# Patient Record
Sex: FEMALE | Race: BLACK OR AFRICAN AMERICAN
[De-identification: names, ages, dates, MRNs, and addresses within clinical notes are randomized per-mention and may not be internally consistent; named-entity substitution may affect disease eponyms.]

---

## 2018-06-03 ENCOUNTER — HOSPITAL ENCOUNTER (INPATIENT)
Dept: HOSPITAL 92 - ERS | Age: 18
LOS: 2 days | Discharge: HOME | DRG: 872 | End: 2018-06-05
Attending: FAMILY MEDICINE | Admitting: FAMILY MEDICINE
Payer: SELF-PAY

## 2018-06-03 VITALS — BODY MASS INDEX: 22.7 KG/M2

## 2018-06-03 DIAGNOSIS — A41.9: Primary | ICD-10-CM

## 2018-06-03 DIAGNOSIS — N12: ICD-10-CM

## 2018-06-03 DIAGNOSIS — B96.89: ICD-10-CM

## 2018-06-03 DIAGNOSIS — N10: ICD-10-CM

## 2018-06-03 DIAGNOSIS — E87.2: ICD-10-CM

## 2018-06-03 LAB
ALBUMIN SERPL BCG-MCNC: 4.1 G/DL (ref 3.5–5)
ALP SERPL-CCNC: 63 U/L (ref 40–150)
ALT SERPL W P-5'-P-CCNC: (no result) U/L (ref 8–55)
ANION GAP SERPL CALC-SCNC: 15 MMOL/L (ref 10–20)
AST SERPL-CCNC: 18 U/L (ref 5–30)
BACTERIA UR QL AUTO: (no result) HPF
BASOPHILS # BLD AUTO: 0 THOU/UL (ref 0–0.2)
BASOPHILS NFR BLD AUTO: 0.5 % (ref 0–1)
BILIRUB SERPL-MCNC: 0.6 MG/DL (ref 0.2–1.2)
BUN SERPL-MCNC: 13 MG/DL (ref 8.4–21)
CALCIUM SERPL-MCNC: 9.3 MG/DL (ref 7.8–10.44)
CHLORIDE SERPL-SCNC: 108 MMOL/L (ref 98–107)
CO2 SERPL-SCNC: 18 MMOL/L (ref 22–29)
CREAT CL PREDICTED SERPL C-G-VRATE: 0 ML/MIN (ref 70–130)
CRYSTAL-AUWI FLAG: 0 (ref 0–15)
DRUG SCREEN CUTOFF: (no result)
EOSINOPHIL # BLD AUTO: 0.2 THOU/UL (ref 0–0.7)
EOSINOPHIL NFR BLD AUTO: 3.2 % (ref 0–10)
GLOBULIN SER CALC-MCNC: 3.2 G/DL (ref 2.4–3.5)
GLUCOSE SERPL-MCNC: 96 MG/DL (ref 70–105)
HEV IGM SER QL: 17.4 (ref 0–7.99)
HGB BLD-MCNC: 13.2 G/DL (ref 12–16)
HYALINE CASTS #/AREA URNS LPF: (no result) LPF
LIPASE SERPL-CCNC: 20 U/L (ref 8–78)
LYMPHOCYTES # BLD: 1.3 THOU/UL (ref 1.2–3.4)
LYMPHOCYTES NFR BLD AUTO: 16.6 % (ref 28–48)
MCH RBC QN AUTO: 29.3 PG (ref 25–35)
MCV RBC AUTO: 88.4 FL (ref 77–87)
MEDTOX CONTROL LINE VALID?: (no result)
MEDTOX READER #: (no result)
MONOCYTES # BLD AUTO: 0.1 THOU/UL (ref 0.11–0.59)
MONOCYTES NFR BLD AUTO: 1.2 % (ref 0–4)
NEUTROPHILS # BLD AUTO: 6.2 THOU/UL (ref 1.4–6.5)
NEUTROPHILS NFR BLD AUTO: 78.5 % (ref 31–61)
PATHC CAST-AUWI FLAG: 0.43 (ref 0–2.49)
PLATELET # BLD AUTO: 251 THOU/UL (ref 130–400)
POTASSIUM SERPL-SCNC: 3.6 MMOL/L (ref 3.5–5.1)
PREGS CONTROL BACKGROUND?: (no result)
PREGS CONTROL BAR APPEAR?: YES
RBC # BLD AUTO: 4.51 MILL/UL (ref 4–5.2)
RBC UR QL AUTO: (no result) HPF (ref 0–3)
SODIUM SERPL-SCNC: 137 MMOL/L (ref 136–145)
SP GR UR STRIP: 1.02 (ref 1–1.04)
SPERM-AUWI FLAG: 0 (ref 0–9.9)
WBC # BLD AUTO: 7.9 THOU/UL (ref 4.8–10.8)
YEAST-AUWI FLAG: 0 (ref 0–25)

## 2018-06-03 PROCEDURE — 36415 COLL VENOUS BLD VENIPUNCTURE: CPT

## 2018-06-03 PROCEDURE — 87086 URINE CULTURE/COLONY COUNT: CPT

## 2018-06-03 PROCEDURE — 83605 ASSAY OF LACTIC ACID: CPT

## 2018-06-03 PROCEDURE — 84703 CHORIONIC GONADOTROPIN ASSAY: CPT

## 2018-06-03 PROCEDURE — 81003 URINALYSIS AUTO W/O SCOPE: CPT

## 2018-06-03 PROCEDURE — 80048 BASIC METABOLIC PNL TOTAL CA: CPT

## 2018-06-03 PROCEDURE — 80306 DRUG TEST PRSMV INSTRMNT: CPT

## 2018-06-03 PROCEDURE — 96375 TX/PRO/DX INJ NEW DRUG ADDON: CPT

## 2018-06-03 PROCEDURE — 83690 ASSAY OF LIPASE: CPT

## 2018-06-03 PROCEDURE — 87149 DNA/RNA DIRECT PROBE: CPT

## 2018-06-03 PROCEDURE — 85025 COMPLETE CBC W/AUTO DIFF WBC: CPT

## 2018-06-03 PROCEDURE — 96361 HYDRATE IV INFUSION ADD-ON: CPT

## 2018-06-03 PROCEDURE — 96365 THER/PROPH/DIAG IV INF INIT: CPT

## 2018-06-03 PROCEDURE — 80053 COMPREHEN METABOLIC PANEL: CPT

## 2018-06-03 PROCEDURE — 87040 BLOOD CULTURE FOR BACTERIA: CPT

## 2018-06-03 PROCEDURE — 87077 CULTURE AEROBIC IDENTIFY: CPT

## 2018-06-03 PROCEDURE — 87186 SC STD MICRODIL/AGAR DIL: CPT

## 2018-06-03 PROCEDURE — 81015 MICROSCOPIC EXAM OF URINE: CPT

## 2018-06-03 RX ADMIN — CEFTRIAXONE SCH MLS: 2 INJECTION, POWDER, FOR SOLUTION INTRAMUSCULAR; INTRAVENOUS at 12:49

## 2018-06-03 NOTE — PDOC.FPRHP
- History of Present Illness


Chief Complaint: R Flank Pain


History of Present Illness: 


This is a 18 y/o F with no PMHx who presents to the ED due to R flank pain. She 

reports that she was going to lay down to go to sleep and then suddenly her 

back started to hurt. It was a dull pain that comes and goes. She reports 

subjective fevers, chills. She reports lower abdominal pain over the past 3-4 

days, but denies any dysuria, urinary frequency, or hematuria. She reports 

feeling her heart racing. 


Denies N/V, myalgias. 





She was brought in by EMS and had a temp of 102.1 and pulse of 120. EMS gave 

her 1g Tylenol. 


ED Course: 


The patient was evaluated in the ED by Dr. Palmer and was given 1g Cefazolin, 

2L NS boluses, and ativan 1mg. 





- Allergies/Adverse Reactions


 Allergies











Allergy/AdvReac Type Severity Reaction Status Date / Time


 


No Known Allergies Allergy   Verified 08/05/17 19:59














- Home Medications


 











 Medication  Instructions  Recorded  Confirmed  Type


 


No Known [No Known]  08/05/17 08/05/17 History














- History


PMHx: None


 


PSHx: None





FHx: Non-contributory


 


Social: Denies tobacco, EtOH, or drug use


 





- Review of Systems


General: reports: fever/chills.  denies: weight/appetite/sleep changes


Eyes: denies: eye pain, vision changes


ENT: reports: rhinorrhea.  denies: nasal congestion


Respiratory: denies: cough, shortness of breath


Cardiovascular: reports: palpitation.  denies: chest pain


Gastrointestinal: reports: abdominal pain (Suprapubic region).  denies: nausea, 

vomiting, diarrhea, constipation


Genitourinary: denies: dysuria, polyuria


Skin: denies: rashes, lesions


Musculoskeletal: denies: pain, tenderness


Neurological: denies: numbness, weakness





- Vital signs


BP: 116/68  HR: 96 RR: 16 Tmax: 99.5 Pox: 98% on RA  Wt: 59kg   








- Physical Exam


Constitutional: NAD, well developed, other (drowsy, easy to arouse, alert and 

oriented)


HEENT: normocephalic and atraumatic, PERRLA, EOMI, conjunctiva clear, grossly 

normal vision, grossly normal hearing, normal nasal mucosa, MMM, oropharynx 

clear


Neck: supple, FROM, no LAD


-Heart: 


tachycardic, regular rhythm, no murmurs, gallops, rubs, 2+ radial and pedal 

pulses


Lungs: CTAB, no respiratory distress, good air movement, no rales/rhonchi, no 

wheezing


-Abdomen: 


soft, mildly tender to palpation in suprapubic region, no rebound or guarding, 

normoactive bowel sounds, no CVA tenderness


Musculoskeletal: normal structure, normal tone


Skin: no rash/lesions, good turgor, capillary refill <2 seconds


Psychiatric: normal mood and affect, good judgment and insight, intact recent 

and remote memory





FMR H&P: Results





- Labs


Result Diagrams: 


 06/03/18 02:08





 06/03/18 01:56


Lab results: 


 











WBC  7.9 thou/uL (4.8-10.8)   06/03/18  02:08    


 


Hgb  13.2 g/dL (12.0-16.0)   06/03/18  02:08    


 


Hct  39.9 % (36.0-47.0)   06/03/18  02:08    


 


MCV  88.4 fl (77.0-87.0)  H  06/03/18  02:08    


 


Plt Count  251 thou/uL (130-400)   06/03/18  02:08    


 


Neutrophils %  78.5 % (31.0-61.0)  H  06/03/18  02:08    


 


Sodium  137 mmol/L (136-145)   06/03/18  01:56    


 


Potassium  3.6 mmol/L (3.5-5.1)   06/03/18  01:56    


 


Chloride  108 mmol/L ()  H  06/03/18  01:56    


 


Carbon Dioxide  18 mmol/L (22-29)  L  06/03/18  01:56    


 


BUN  13 mg/dL (8.4-21.0)   06/03/18  01:56    


 


Creatinine  0.82 mg/dL (0.6-1.1)   06/03/18  01:56    


 


Glucose  96 mg/dL ()   06/03/18  01:56    


 


Lactic Acid  3.7 mmol/L (0.5-2.2)  H  06/03/18  02:08    


 


Calcium  9.3 mg/dL (7.8-10.44)   06/03/18  01:56    


 


Total Bilirubin  0.6 mg/dL (0.2-1.2)   06/03/18  01:56    


 


AST  18 U/L (5-30)   06/03/18  01:56    


 


ALT  Less than  7 U/L (8-55)  L  06/03/18  01:56    


 


Alkaline Phosphatase  63 U/L ()   06/03/18  01:56    


 


Serum Total Protein  7.3 g/dL (6.0-8.3)   06/03/18  01:56    


 


Albumin  4.1 g/dL (3.5-5.0)   06/03/18  01:56    


 


Lipase  20 U/L (8-78)   06/03/18  01:56    


 


Urine Ketones  Negative mg/dL (Negative)   06/03/18  03:17    


 


Urine Blood  Negative  (Negative)   06/03/18  03:17    


 


Urine Nitrite  Positive  (Negative)  H  06/03/18  03:17    


 


Ur Leukocyte Esterase  Moderate  (Negative)  H  06/03/18  03:17    


 


Urine RBC  0-3 HPF (0-3)   06/03/18  03:17    


 


Urine WBC  11-20 HPF (0-3)  H  06/03/18  03:17    


 


Ur Squamous Epith Cells  4-6 HPF (0-3)  H  06/03/18  03:17    


 


Urine Bacteria  1+ HPF (None Seen)  H  06/03/18  03:17    














FMR H&P: A/P





- Problem List


(1) Sepsis


Current Visit: Yes   Status: Acute   Code(s): A41.9 - SEPSIS, UNSPECIFIED 

ORGANISM   


Qualifiers: 


   Sepsis type: sepsis due to unspecified organism   Qualified Code(s): A41.9 - 

Sepsis, unspecified organism   





(2) Pyelonephritis


Current Visit: Yes   Status: Acute   Code(s): N12 - TUBULO-INTERSTITIAL 

NEPHRITIS, NOT SPCF AS ACUTE OR CHRONIC   





(3) Lactic acidosis


Current Visit: No   Status: Acute   Code(s): E87.2 - ACIDOSIS   





- Plan


Sepsis 2/2 Pyelonephritis


Patient initially septic with fever, tachycardia, UTI as the source, and 

elevated lactic acid. Her urine had Nitrites, LE, WBC, and bacteria. She states 

that she did have CVA tenderness earlier, but did not have CVA tenderness 

during my examination in the ED. The patient is s/p 1g tylenol, 2L NS, 1g 

Cefazolin, and 1mg Ativan. She is still tachycardic, but her fever has 

improved. The patient does not have an elevated WBC count. 


-Rocephin


-Blood cultures


-Urine culture


-Acetaminophen prn fever


-LR @ 100


-Zofran prn nausea


-Trend lactic acid





Lactic Acidosis


Elevated lactic acid to 3.7 initially, likely 2/2 infection


-Trend lactic acid


-LR @ 100, s/p 2L NS boluses in the ED





VTE Ppx: SCD's


Code Status: full


Disposition/LOS: 


Admit to medical, length of stay likely 2 days





FMR H&P: Upper Level





- Pertinent history





PCP: CC





Patient is a 16yo F with no significant PMHx who presents with acute onset RT 

sided back pain. She states she was going to bed when all of a sudden she had 

dull like pain located at RT flank. Endorses few day history of bladder pain 

as well but denies any dysuria or increased urinary frequency. Endorses 

subjective fevers, chills and heart racing. Pt had temp of 102.1 in EMS. Denies 

any N/V, diarrhea, abd pain or myalgias. 





ED: Cefazolin 1g, 2L NS, Ativan 1mg, Tylenol 1g (EMS)





- Pertinent findings





T 99.1 /86   RR 30  O2 96% on RA  Wt. 59kg





General: NAD, resting in bed


Heart: S1 S2, tachycardic


Lungs: CTAB


Abd: soft, nt/nd/bs+, + suprapubic tenderness, no CVA tenderness





WBC 7.9, neutrophils 78.5%


Lactate 3.7


UA: leuks-moderate, nitrite (+), WBC 11-20, Bacteria 1+





- Plan


Date/Time: 06/03/18 7912





1. Sepsis 2/2 RT sided pyelonephritis: Patient with tachycardia to 119 and 

tachypnea of 30. Febrile to 102.1 in EMS but has since resolved with Tylenol. 

Patient likely has pyelonephritis though her exam was negative for CVA 

tenderness but states she did have it earlier. Given 2L bolus of NS in the ED. 

Cont IVF. Given Cefazolin in the ED. Cont Rocephin. Urine and blood cxs 

pending. 


2. RT sided pyelonephritis: See #1. 


3. Lactic acidosis: initial lactate of 3.7. Given 2L bolus of NS in the ED. 

Cont IVF. Repeat lactate q2H. 


4. Diet: regular


5. PPx: SCDs


6. Code Status: Full 





I, Shona Nolasco, have evaluated this patient and agree with findings/

plan as outlined by intern resident. Pertinent changes/additions are listed 

here.

## 2018-06-04 LAB
ANION GAP SERPL CALC-SCNC: 8 MMOL/L (ref 10–20)
BASOPHILS # BLD AUTO: 0 THOU/UL (ref 0–0.2)
BASOPHILS NFR BLD AUTO: 0.3 % (ref 0–1)
BUN SERPL-MCNC: 5 MG/DL (ref 8.4–21)
CALCIUM SERPL-MCNC: 8.8 MG/DL (ref 7.8–10.44)
CHLORIDE SERPL-SCNC: 111 MMOL/L (ref 98–107)
CO2 SERPL-SCNC: 22 MMOL/L (ref 22–29)
CREAT CL PREDICTED SERPL C-G-VRATE: 116 ML/MIN (ref 70–130)
EOSINOPHIL # BLD AUTO: 0.2 THOU/UL (ref 0–0.7)
EOSINOPHIL NFR BLD AUTO: 1.9 % (ref 0–10)
GLUCOSE SERPL-MCNC: 116 MG/DL (ref 70–105)
HGB BLD-MCNC: 12.1 G/DL (ref 12–16)
LYMPHOCYTES # BLD: 1.5 THOU/UL (ref 1.2–3.4)
LYMPHOCYTES NFR BLD AUTO: 15.1 % (ref 28–48)
MCH RBC QN AUTO: 29.1 PG (ref 25–35)
MCV RBC AUTO: 91.9 FL (ref 77–87)
MONOCYTES # BLD AUTO: 0.9 THOU/UL (ref 0.11–0.59)
MONOCYTES NFR BLD AUTO: 9.1 % (ref 0–4)
NEUTROPHILS # BLD AUTO: 7.2 THOU/UL (ref 1.4–6.5)
NEUTROPHILS NFR BLD AUTO: 73.5 % (ref 31–61)
PLATELET # BLD AUTO: 244 THOU/UL (ref 130–400)
POTASSIUM SERPL-SCNC: 3.7 MMOL/L (ref 3.5–5.1)
RBC # BLD AUTO: 4.15 MILL/UL (ref 4–5.2)
SODIUM SERPL-SCNC: 137 MMOL/L (ref 136–145)
WBC # BLD AUTO: 9.8 THOU/UL (ref 4.8–10.8)

## 2018-06-04 RX ADMIN — CEFTRIAXONE SCH MLS: 2 INJECTION, POWDER, FOR SOLUTION INTRAMUSCULAR; INTRAVENOUS at 12:00

## 2018-06-04 NOTE — PDOC.FM
- Subjective


Subjective: 





No acute events overnight. Denies dysuria or back pain today.





- Objective


MAR Reviewed: Yes


Vital Signs & Weight: 


 Vital Signs (12 hours)











  Temp Pulse Resp BP Pulse Ox


 


 06/04/18 04:00  98.5 F  80  18  101/64  97


 


 06/03/18 23:19  99.7 F H  78  20  100/61  99


 


 06/03/18 20:00  99.9 F H  100  18  112/63  99











I&O: 


 











 06/02/18 06/03/18 06/04/18





 06:59 06:59 06:59


 


Intake Total  300 


 


Output Total  0 


 


Balance  300 











Result Diagrams: 


 06/04/18 04:31





 06/04/18 04:31





<Tatyana Soliz - Last Filed: 06/04/18 09:14>





- Objective


Vital Signs & Weight: 


 Vital Signs (12 hours)











  Temp Pulse Resp BP Pulse Ox


 


 06/04/18 15:33  98.3 F  80  14  97/63  100


 


 06/04/18 11:44  98.0 F  60  15  97/63  99


 


 06/04/18 08:30  98.4 F  70  16   99


 


 06/04/18 08:25  98.4 F  70  16  94/56 L  99











I&O: 


 











 06/03/18 06/04/18 06/05/18





 06:59 06:59 06:59


 


Intake Total 300 2280 


 


Output Total 0  


 


Balance 300 2280 











Result Diagrams: 


 06/04/18 04:31





 06/04/18 04:31





<Fannie Betancourt - Last Filed: 06/04/18 19:22>





Phys Exam





- Physical Examination


Constitutional: NAD


HEENT: PERRLA, moist MMs


Neck: no JVD, supple


Respiratory: no wheezing, no rales, clear to auscultation bilateral


Cardiovascular: RRR, no significant murmur


Gastrointestinal: soft, non-tender, no distention


Musculoskeletal: no edema, pulses present


Neurological: non-focal, normal sensation


Psychiatric: normal affect, A&O x 3


Skin: no rash





<Tatyana Soliz - Last Filed: 06/04/18 09:14>





Dx/Plan


(1) Bacteremia


Code(s): R78.81 - BACTEREMIA   Status: Acute   





(2) Pyelonephritis


Code(s): N12 - TUBULO-INTERSTITIAL NEPHRITIS, NOT SPCF AS ACUTE OR CHRONIC   

Status: Acute   





(3) Sepsis


Code(s): A41.9 - SEPSIS, UNSPECIFIED ORGANISM   Status: Acute   


  QualifierTitle:    Sepsis type: sepsis due to unspecified organism   

Qualified Code(s): A41.9 - Sepsis, unspecified organism   





(4) Lactic acidosis


Code(s): E87.2 - ACIDOSIS   Status: Acute   





- Plan


Plan: 





Sepsis 2/2 Pyelonephritis


-Patient initially septic with fever, tachycardia, UTI as the source, and 

elevated lactic acid. 


-Urine had Nitrites, LE, WBC, and bacteria. 


-Right sided back pain, no cva tenderness on exam


-The patient is s/p 1g tylenol, 2L NS, 1g Cefazolin, and 1mg Ativan. 


-urine cx  is growing E. coli and patient's blood cx is + for gram negative rods

, likely E. coli from her urine.


-Continue IV rocephin until 48 hours afebrile, then we will transistion her to 

PO abx and consider discharge with abx for a total of 10 days.


-Acetaminophen prn fever


-LR @ 100


-Zofran prn nausea





Bacteremia 2/2 Pyelonephritis


-Consult ID for duration of treatment


-Continue IV rocephin until 48 hours afebrile, then we will transistion her to 

PO abx and consider discharge with abx for a total of 10 days.


-Acetaminophen prn fever


-LR @ 100


-Zofran prn nausea





Lactic Acidosis, resoled


-Elevated lactic acid to 3.7 initially, likely 2/2 infection


-lactic acid resolved


-LR @ 100, s/p 2L NS boluses in the ED





VTE Ppx: SCD's


Code Status: full





Disposition/LOS: 


Admit to medical, length of stay likely 2 days





<Tatyana Soliz - Last Filed: 06/04/18 09:14>





Attending Addendum





- Attending Addendum


Date/Time: 06/04/18 1922





I personally evaluated the patient and discussed the management with Dr. Soliz.


I agree with the History, Examination, Assessment and Plan documented above 

with any addition or exceptions noted below.


Pt will remain on IV antibiotics for bacteremia 2/2 to UTI until we get final 

sensitivities.





<Fannie Betancourt - Last Filed: 06/04/18 19:22>

## 2018-06-05 VITALS — TEMPERATURE: 98.2 F | DIASTOLIC BLOOD PRESSURE: 77 MMHG | SYSTOLIC BLOOD PRESSURE: 118 MMHG

## 2018-06-05 LAB
ANION GAP SERPL CALC-SCNC: 10 MMOL/L (ref 10–20)
BASOPHILS # BLD AUTO: 0.1 THOU/UL (ref 0–0.2)
BASOPHILS NFR BLD AUTO: 1.1 % (ref 0–1)
BUN SERPL-MCNC: 6 MG/DL (ref 8.4–21)
CALCIUM SERPL-MCNC: 9.2 MG/DL (ref 7.8–10.44)
CHLORIDE SERPL-SCNC: 112 MMOL/L (ref 98–107)
CO2 SERPL-SCNC: 20 MMOL/L (ref 22–29)
CREAT CL PREDICTED SERPL C-G-VRATE: 130 ML/MIN (ref 70–130)
EOSINOPHIL # BLD AUTO: 0.5 THOU/UL (ref 0–0.7)
EOSINOPHIL NFR BLD AUTO: 7.8 % (ref 0–10)
GLUCOSE SERPL-MCNC: 106 MG/DL (ref 70–105)
HGB BLD-MCNC: 12.6 G/DL (ref 12–16)
LYMPHOCYTES # BLD: 2 THOU/UL (ref 1.2–3.4)
LYMPHOCYTES NFR BLD AUTO: 31.2 % (ref 28–48)
MCH RBC QN AUTO: 29 PG (ref 25–35)
MCV RBC AUTO: 91.5 FL (ref 77–87)
MONOCYTES # BLD AUTO: 0.9 THOU/UL (ref 0.11–0.59)
MONOCYTES NFR BLD AUTO: 14.8 % (ref 0–4)
NEUTROPHILS # BLD AUTO: 2.9 THOU/UL (ref 1.4–6.5)
NEUTROPHILS NFR BLD AUTO: 45.1 % (ref 31–61)
PLATELET # BLD AUTO: 222 THOU/UL (ref 130–400)
POTASSIUM SERPL-SCNC: 4 MMOL/L (ref 3.5–5.1)
RBC # BLD AUTO: 4.32 MILL/UL (ref 4–5.2)
SODIUM SERPL-SCNC: 138 MMOL/L (ref 136–145)
WBC # BLD AUTO: 6.3 THOU/UL (ref 4.8–10.8)

## 2018-06-05 RX ADMIN — CEFTRIAXONE SCH MLS: 2 INJECTION, POWDER, FOR SOLUTION INTRAMUSCULAR; INTRAVENOUS at 11:46

## 2018-06-05 NOTE — PDOC.FM
- Subjective


Subjective: 





No acute events overnight. Afebrile for >48 hours. Denies dysuria or back pain.





- Objective


MAR Reviewed: Yes


Vital Signs & Weight: 


 Vital Signs (12 hours)











  Temp Pulse Resp BP Pulse Ox


 


 06/05/18 04:00  97.8 F  77  18  96/62  100


 


 06/05/18 00:56  98.0 F  68  14  94/59 L  100


 


 06/04/18 20:00  98.0 F  68  18  95/61  100











I&O: 


 











 06/04/18 06/05/18 06/06/18





 06:59 06:59 06:59


 


Intake Total 2280 2280 


 


Balance 2280 2280 











Result Diagrams: 


 06/05/18 03:23





 06/05/18 03:23





<Tatyana Soliz - Last Filed: 06/05/18 08:59>





- Objective


Vital Signs & Weight: 


 Vital Signs (12 hours)











  Temp Pulse Resp BP BP Pulse Ox


 


 06/05/18 12:28  98.2 F  59 L  14  118/77   100


 


 06/05/18 08:00  97.9 F  59 L  14   91/56 L  100


 


 06/05/18 04:00  97.8 F  77  18  96/62   100











I&O: 


 











 06/04/18 06/05/18 06/06/18





 06:59 06:59 06:59


 


Intake Total 2280 2280 


 


Balance 2280 2280 











Result Diagrams: 


 06/05/18 03:23





 06/05/18 03:23





<Fannie Betancourt - Last Filed: 06/05/18 14:28>





Phys Exam





- Physical Examination


Constitutional: NAD


HEENT: PERRLA, moist MMs


Respiratory: no wheezing, no rales, no rhonchi, clear to auscultation bilateral


Cardiovascular: RRR, no significant murmur


Gastrointestinal: soft, non-tender, no distention


Musculoskeletal: no edema, pulses present


Neurological: non-focal, normal sensation


Psychiatric: normal affect, A&O x 3


Skin: no rash, normal turgor





<Tatyana Soliz - Last Filed: 06/05/18 08:59>





Dx/Plan


(1) Bacteremia


Code(s): R78.81 - BACTEREMIA   Status: Acute   





(2) Pyelonephritis


Code(s): N12 - TUBULO-INTERSTITIAL NEPHRITIS, NOT SPCF AS ACUTE OR CHRONIC   

Status: Acute   





(3) Sepsis


Code(s): A41.9 - SEPSIS, UNSPECIFIED ORGANISM   Status: Acute   


  QualifierTitle:    Sepsis type: sepsis due to unspecified organism   

Qualified Code(s): A41.9 - Sepsis, unspecified organism   





(4) Lactic acidosis


Code(s): E87.2 - ACIDOSIS   Status: Acute   





- Plan


Plan: 





Sepsis 2/2 Pyelonephritis


-Patient initially septic with fever, tachycardia, UTI as the source, and 

elevated lactic acid. 


-Afebrile >48 hours


-urine and blood cx + for e. coli


-transition her to omnicef today for a total of 10 days.


-Acetaminophen prn fever


-LR @ 100


-Zofran prn nausea





Bacteremia 2/2 Pyelonephritis


-Consult ID for duration of treatment


-Continue IV rocephin until 48 hours afebrile, then we will transistion her to 

PO abx and consider discharge with abx for a total of 10 days.


-Acetaminophen prn fever


-LR @ 100


-Zofran prn nausea





Lactic Acidosis, resoled


-lactic acid resolved


-LR @ 100, s/p 2L NS boluses in the ED





VTE Ppx: SCD's


Code Status: full





Disposition/LOS: Likely DC today





<Tatyana Soliz - Last Filed: 06/05/18 08:59>





Attending Addendum





- Attending Addendum


Date/Time: 06/05/18 9400





I personally evaluated the patient and discussed the management with Dr. Soliz.


I agree with the History, Examination, Assessment and Plan documented above 

with any addition or exceptions noted below.


The patient is doing well.  will transition to omnicef and d/c with 14 day 

course of antibiotics.





<Fannie Betancourt - Last Filed: 06/05/18 14:28>

## 2019-06-18 ENCOUNTER — HOSPITAL ENCOUNTER (EMERGENCY)
Dept: HOSPITAL 92 - ERS | Age: 19
Discharge: HOME | End: 2019-06-18
Payer: SELF-PAY

## 2019-06-18 DIAGNOSIS — R10.9: ICD-10-CM

## 2019-06-18 DIAGNOSIS — Z3A.01: ICD-10-CM

## 2019-06-18 DIAGNOSIS — O23.41: Primary | ICD-10-CM

## 2019-06-18 DIAGNOSIS — O99.89: ICD-10-CM

## 2019-06-18 LAB
ALBUMIN SERPL BCG-MCNC: 4.6 G/DL (ref 3.5–5)
ALP SERPL-CCNC: 52 U/L (ref 40–150)
ALT SERPL W P-5'-P-CCNC: 7 U/L (ref 8–55)
ANION GAP SERPL CALC-SCNC: 12 MMOL/L (ref 10–20)
AST SERPL-CCNC: 15 U/L (ref 5–34)
BACTERIA UR QL AUTO: (no result) HPF
BASOPHILS # BLD AUTO: 0.1 THOU/UL (ref 0–0.2)
BASOPHILS NFR BLD AUTO: 1.1 % (ref 0–1)
BILIRUB SERPL-MCNC: 0.8 MG/DL (ref 0.2–1.2)
BUN SERPL-MCNC: 5 MG/DL (ref 7–18.7)
CALCIUM SERPL-MCNC: 9.7 MG/DL (ref 7.8–10.44)
CHLORIDE SERPL-SCNC: 105 MMOL/L (ref 98–107)
CO2 SERPL-SCNC: 21 MMOL/L (ref 22–29)
CREAT CL PREDICTED SERPL C-G-VRATE: 0 ML/MIN (ref 70–130)
CRYSTAL-AUWI FLAG: 0 (ref 0–15)
EOSINOPHIL # BLD AUTO: 0.2 THOU/UL (ref 0–0.7)
EOSINOPHIL NFR BLD AUTO: 3.5 % (ref 0–10)
GLOBULIN SER CALC-MCNC: 3.3 G/DL (ref 2.4–3.5)
GLUCOSE SERPL-MCNC: 88 MG/DL (ref 70–105)
HCG SERPL QL: POSITIVE
HEV IGM SER QL: 0.4 (ref 0–7.99)
HGB BLD-MCNC: 14.1 G/DL (ref 12–16)
HYALINE CASTS #/AREA URNS LPF: (no result) LPF
LIPASE SERPL-CCNC: 11 U/L (ref 8–78)
LYMPHOCYTES # BLD: 1.5 THOU/UL (ref 1.2–3.4)
LYMPHOCYTES NFR BLD AUTO: 26 % (ref 28–48)
MCH RBC QN AUTO: 28.7 PG (ref 25–35)
MCV RBC AUTO: 89.3 FL (ref 78–98)
MONOCYTES # BLD AUTO: 0.3 THOU/UL (ref 0.11–0.59)
MONOCYTES NFR BLD AUTO: 5.4 % (ref 0–4)
NEUTROPHILS # BLD AUTO: 3.6 THOU/UL (ref 1.4–6.5)
NEUTROPHILS NFR BLD AUTO: 64 % (ref 31–61)
PATHC CAST-AUWI FLAG: 1.63 (ref 0–2.49)
PLATELET # BLD AUTO: 266 THOU/UL (ref 130–400)
POTASSIUM SERPL-SCNC: 3.7 MMOL/L (ref 3.5–5.1)
PREGS CONTROL BACKGROUND?: (no result)
PREGS CONTROL BAR APPEAR?: YES
RBC # BLD AUTO: 4.9 MILL/UL (ref 4–5.2)
RBC UR QL AUTO: (no result) HPF (ref 0–3)
SODIUM SERPL-SCNC: 134 MMOL/L (ref 136–145)
SP GR UR STRIP: 1.02 (ref 1–1.04)
SPERM-AUWI FLAG: 0 (ref 0–9.9)
WBC # BLD AUTO: 5.7 THOU/UL (ref 4.8–10.8)
WBC UR QL AUTO: (no result) HPF (ref 0–3)
YEAST-AUWI FLAG: 0 (ref 0–25)

## 2019-06-18 PROCEDURE — 36415 COLL VENOUS BLD VENIPUNCTURE: CPT

## 2019-06-18 PROCEDURE — 80053 COMPREHEN METABOLIC PANEL: CPT

## 2019-06-18 PROCEDURE — 96361 HYDRATE IV INFUSION ADD-ON: CPT

## 2019-06-18 PROCEDURE — 93976 VASCULAR STUDY: CPT

## 2019-06-18 PROCEDURE — 85025 COMPLETE CBC W/AUTO DIFF WBC: CPT

## 2019-06-18 PROCEDURE — 84702 CHORIONIC GONADOTROPIN TEST: CPT

## 2019-06-18 PROCEDURE — 86900 BLOOD TYPING SEROLOGIC ABO: CPT

## 2019-06-18 PROCEDURE — 76856 US EXAM PELVIC COMPLETE: CPT

## 2019-06-18 PROCEDURE — 83690 ASSAY OF LIPASE: CPT

## 2019-06-18 PROCEDURE — 96365 THER/PROPH/DIAG IV INF INIT: CPT

## 2019-06-18 PROCEDURE — 86901 BLOOD TYPING SEROLOGIC RH(D): CPT

## 2019-06-18 PROCEDURE — 84703 CHORIONIC GONADOTROPIN ASSAY: CPT

## 2019-06-18 PROCEDURE — 81003 URINALYSIS AUTO W/O SCOPE: CPT

## 2019-06-18 PROCEDURE — 96375 TX/PRO/DX INJ NEW DRUG ADDON: CPT

## 2019-06-18 PROCEDURE — 96372 THER/PROPH/DIAG INJ SC/IM: CPT

## 2019-06-18 PROCEDURE — 81015 MICROSCOPIC EXAM OF URINE: CPT

## 2019-06-18 NOTE — ULT
US Pelvic W Doppler



HISTORY: Abdomen and pelvic pain no vaginal bleeding



COMPARISON: None.



FINDINGS: Real-time imaging of the pelvis was obtained transabdominally. This shows a single viable i
ntrauterine pregnancy. Gestational sac measurements are 1.7 cm corresponding to 6 weeks 4 days.

Crown to rump length measurements are 4.8 mm corresponding to 6 weeks 1 day. The fetal heart rate is 
117 bpm. No subchorionic bleed.



Neither the right or left ovaries are visualized. No free fluid.



IMPRESSION: Single viable anterior pregnancy measurements corresponding to 6 weeks 3 days with an est
imated date of delivery of 2/8/2020



Reported By: Zach Fairbanks 

Electronically Signed:  6/18/2019 9:34 PM

## 2019-06-20 ENCOUNTER — HOSPITAL ENCOUNTER (EMERGENCY)
Dept: HOSPITAL 92 - ERS | Age: 19
Discharge: HOME | End: 2019-06-20
Payer: MEDICAID

## 2019-06-20 DIAGNOSIS — Z3A.01: ICD-10-CM

## 2019-06-20 DIAGNOSIS — O21.0: Primary | ICD-10-CM

## 2019-06-20 LAB
ALBUMIN SERPL BCG-MCNC: 4.4 G/DL (ref 3.5–5)
ALP SERPL-CCNC: 51 U/L (ref 40–150)
ALT SERPL W P-5'-P-CCNC: 7 U/L (ref 8–55)
ANION GAP SERPL CALC-SCNC: 13 MMOL/L (ref 10–20)
AST SERPL-CCNC: 18 U/L (ref 5–34)
BASOPHILS # BLD AUTO: 0 THOU/UL (ref 0–0.2)
BASOPHILS NFR BLD AUTO: 0.6 % (ref 0–1)
BILIRUB SERPL-MCNC: 0.9 MG/DL (ref 0.2–1.2)
BUN SERPL-MCNC: 5 MG/DL (ref 7–18.7)
CALCIUM SERPL-MCNC: 9.8 MG/DL (ref 7.8–10.44)
CHLORIDE SERPL-SCNC: 106 MMOL/L (ref 98–107)
CO2 SERPL-SCNC: 21 MMOL/L (ref 22–29)
CREAT CL PREDICTED SERPL C-G-VRATE: 0 ML/MIN (ref 70–130)
EOSINOPHIL # BLD AUTO: 0.1 THOU/UL (ref 0–0.7)
EOSINOPHIL NFR BLD AUTO: 2.8 % (ref 0–10)
GLOBULIN SER CALC-MCNC: 3.3 G/DL (ref 2.4–3.5)
GLUCOSE SERPL-MCNC: 78 MG/DL (ref 70–105)
HGB BLD-MCNC: 13.7 G/DL (ref 12–16)
LYMPHOCYTES # BLD: 1.6 THOU/UL (ref 1.2–3.4)
LYMPHOCYTES NFR BLD AUTO: 30.3 % (ref 28–48)
MCH RBC QN AUTO: 28.9 PG (ref 25–35)
MCV RBC AUTO: 88.6 FL (ref 78–98)
MONOCYTES # BLD AUTO: 0.3 THOU/UL (ref 0.11–0.59)
MONOCYTES NFR BLD AUTO: 5.3 % (ref 0–4)
NEUTROPHILS # BLD AUTO: 3.2 THOU/UL (ref 1.4–6.5)
NEUTROPHILS NFR BLD AUTO: 60.9 % (ref 31–61)
PLATELET # BLD AUTO: 257 THOU/UL (ref 130–400)
POTASSIUM SERPL-SCNC: 3.7 MMOL/L (ref 3.5–5.1)
RBC # BLD AUTO: 4.75 MILL/UL (ref 4–5.2)
SODIUM SERPL-SCNC: 136 MMOL/L (ref 136–145)
WBC # BLD AUTO: 5.3 THOU/UL (ref 4.8–10.8)

## 2019-06-20 PROCEDURE — 80053 COMPREHEN METABOLIC PANEL: CPT

## 2019-06-20 PROCEDURE — 96375 TX/PRO/DX INJ NEW DRUG ADDON: CPT

## 2019-06-20 PROCEDURE — 85025 COMPLETE CBC W/AUTO DIFF WBC: CPT

## 2019-06-20 PROCEDURE — 96365 THER/PROPH/DIAG IV INF INIT: CPT

## 2019-06-20 PROCEDURE — 96361 HYDRATE IV INFUSION ADD-ON: CPT

## 2019-09-20 ENCOUNTER — HOSPITAL ENCOUNTER (OUTPATIENT)
Dept: HOSPITAL 92 - BICULT | Age: 19
Discharge: HOME | End: 2019-09-20
Payer: COMMERCIAL

## 2019-09-20 DIAGNOSIS — Z3A.20: ICD-10-CM

## 2019-09-20 DIAGNOSIS — Z34.82: Primary | ICD-10-CM

## 2019-09-20 PROCEDURE — 76805 OB US >/= 14 WKS SNGL FETUS: CPT

## 2019-09-20 NOTE — ULT
OB ULTRASOUND:

 

HISTORY: 

Fetal anatomy.

 

FINDINGS: 

A single live intrauterine gestation is seen with measurements corresponding to an estimated gestatio
nal age of 20 weeks 3 days and BRUNO at 2/4/2020.

 

The estimated fetal weight measures 347 gm or 12 ounces (73% by Hadlock criteria).

 

Fetal measurements are as follows:

BPD       4.66 cm, 20 weeks 1 day

HC       17.73 cm, 20 weeks 2 days

AC       14.84 cm, 20 weeks 1 day

FL        3.39 cm, 20 weeks 5 days

 

Fetal heart rate measures 150 b.p.m.  Placenta is posteriorly located without evidence of placenta pr
evia.  JUN measures 10.6 cm.  The cervix measures 3.2 cm in length.

 

A 3-vessel cord, cord insertion, fetal kidneys, bladder, stomach, 4-chamber heart, lateral ventricles
, cerebellum, spine, lips/nose, upper and lower extremities are visualized.  No definite fetal anomal
ies are seen.

 

IMPRESSION: 

Single live intrauterine pregnancy of 20 weeks 3 days estimated gestational age and estimated date of
 delivery at 2/4/2020.

 

POS: [facility]

## 2019-11-28 ENCOUNTER — HOSPITAL ENCOUNTER (OUTPATIENT)
Dept: HOSPITAL 92 - L&D/OP | Age: 19
Discharge: HOME | End: 2019-11-28
Attending: OBSTETRICS & GYNECOLOGY
Payer: COMMERCIAL

## 2019-11-28 VITALS — SYSTOLIC BLOOD PRESSURE: 121 MMHG | DIASTOLIC BLOOD PRESSURE: 69 MMHG | TEMPERATURE: 98.9 F

## 2019-11-28 VITALS — BODY MASS INDEX: 24.9 KG/M2

## 2019-11-28 DIAGNOSIS — Z3A.38: ICD-10-CM

## 2019-11-28 DIAGNOSIS — O47.1: Primary | ICD-10-CM

## 2019-11-28 PROCEDURE — 99282 EMERGENCY DEPT VISIT SF MDM: CPT

## 2019-11-28 PROCEDURE — 76815 OB US LIMITED FETUS(S): CPT

## 2019-11-28 NOTE — HP
This is a patient Dr. Dooley.



TIME:  0155.



LOCATION:  Labor and Delivery triage.



CHIEF COMPLAINT:  Irregular contractions at 30 weeks.



HISTORY OF PRESENT ILLNESS:  This is a 19-year-old, G2, P1, history of vaginal

delivery in the past, who is at 38 weeks and 2 days, who states she is having

irregular contractions since midnight.  She has no vaginal bleeding and no leakage

of fluid and she has good fetal movement.  She denies recent trauma or recent

intercourse. 



REVIEW OF SYSTEMS:  Complete review of systems was checked and is otherwise negative

unless specified in the HPI. 



PAST MEDICAL HISTORY:  Negative.



ALLERGIES:  REPORTED ARE NONE.



PAST SURGICAL HISTORY:  Noncontributory .



OB HISTORY:  She is a G2, P1.



PHYSICAL EXAMINATION:

VITAL SIGNS:  Blood pressure is 121/70, temperature is 98.9, respirations are 17 to

18, and they are unlabored.  Clinically, she is in no acute distress. 

ABDOMEN:  Soft and nontender. 

CERVICAL EXAM:  Deferred pending a cervical length ultrasound. 



Fetal monitor shows fetal heart tones in the 130s to 140s and they are reactive.

There is irregular uterine contractions on the tocodynamometer that looks more like

irritability. 



ASSESSMENT:  This is a G2, P1, at 38 weeks and 2 days, here with possible

contractions.  She denies recent intercourse. 



PLAN:  

1. Cervical length ordered.

2. If her cervical length is greater than 2.5, this is reassuring.  If it is less

than 2.5, we will order an FFN. 

3. Reactive nonstress test.

4. P.o. hydrate.

5. Final disposition will depend on the cervical length.







Job ID:  [id number]

## 2019-11-28 NOTE — ULT
PRELIMINARY REPORT/[facility]/EMERGENCY AFTER

HOURS PROCEDURE: 

 

PROCEDURE INFORMATION:

Exam: US Pregnancy, Limited

Exam date and time: 11/28/2019 2:10 AM

Age: 19 years old

 

Clinical history: Pregnancy complicated by abdominal or pelvic pain; Lower; Third trimester; 

Gestational age or lmp: 31w0d; Pregnant; Patient HX: Premature contractions, eval. Cervical length

 

TECHNIQUE:

Imaging protocol: Real-time ultrasound of the pregnant maternal uterus with i

mage documentation. Exam focused on the clinical indication.

COMPARISON:

 

No relevant prior studies available.

FINDINGS:

Single living intrauterine gestation in vertex presentation. Fetal heart rate: 145 bpm.

AUA: 31w0d. BRUNO(AUA): 1/30/2020. BRUNO(LMP): 2/4/2020. EFW: 1673g-61percentile.

Placenta is posterior grade 1. No abruption. JUN: 11.4cm. Cervix is closed measuring 3.1-3.4cm in demetrius
gth.

 

IMPRESSION:

Single viable intrauterine pregnancy. No acute findings.

 

Thank you for allowing us to participate in the care of your patient.

Dictated and Authenticated by: Hernan Arellano MD

11/28/2019 3:42 AM Central Time ([place] & [place])

 

 

 

 

FINAL REPORT 

 

EMERGENCY AFTER HOURS OB ULTRASOUND:

I agree with the preliminary report provided by Ashley. 

 

 

POS: [facility]

## 2019-12-06 ENCOUNTER — HOSPITAL ENCOUNTER (OUTPATIENT)
Dept: HOSPITAL 92 - BICULT | Age: 19
Discharge: HOME | End: 2019-12-06
Attending: OBSTETRICS & GYNECOLOGY
Payer: COMMERCIAL

## 2019-12-06 DIAGNOSIS — Z34.83: Primary | ICD-10-CM

## 2019-12-06 DIAGNOSIS — Z3A.31: ICD-10-CM

## 2019-12-06 PROCEDURE — 76815 OB US LIMITED FETUS(S): CPT

## 2019-12-06 NOTE — ULT
Limited Obstetrical Ultrasound



INDICATION: Evaluate fetal growth



TECHNIQUE: Grayscale, M-mode Doppler, color Doppler and spectral Doppler images were obtained. Go cobos is focused on the clinical indication.



COMPARISON: Prior Limited obstetrical ultrasound dated November 20, 2019



FINDINGS:

GESTATION:

Number of gestations: Single.

Presentation: Cephalic.

Fetal heart rate: 147 bpm.

Placental location: Posterior

Previa:  No evidence for previa.

Cervical length: Not measured

JUN: 9.06 cm.





LIMITED FETAL SURVEY:

No abnormality as visualized.



FETAL BIOMETRY:



Biparietal diameter: 7.80cm, 31 weeks and 3 days, Not calculated..

Head circumference: 28.51 cm, 31 weeks and 3 days, Not calculated.

Abdominal circumference: 27.27 cm, 31 weeks and 3 days, Not calculated.

Femoral length: 6.04cm, 31 weeks and 3 days, Not calculated.

Estimated fetal weight: 1751 g g +/- 256g 3 lbs. 14 oz., 36th percentile



The average gestational age by ultrasound is 31 weeks and 3 dayswith estimated due date of February 4
, 2020.



The estimated dates by clinical data is 31 weeks and 3 dayswith estimated due date of February 4, 202
0.





IMPRESSION:

1. Single live intrauterine gestation with size and dates as above.



Reported By: Jeovanny Salmon 

Electronically Signed:  12/6/2019 3:54 PM

## 2019-12-24 ENCOUNTER — HOSPITAL ENCOUNTER (INPATIENT)
Dept: HOSPITAL 92 - L&D/OP | Age: 19
LOS: 1 days | Discharge: HOME | DRG: 833 | End: 2019-12-25
Attending: OBSTETRICS & GYNECOLOGY | Admitting: OBSTETRICS & GYNECOLOGY
Payer: COMMERCIAL

## 2019-12-24 VITALS — BODY MASS INDEX: 22.3 KG/M2

## 2019-12-24 DIAGNOSIS — O47.03: Primary | ICD-10-CM

## 2019-12-24 DIAGNOSIS — Z3A.33: ICD-10-CM

## 2019-12-24 LAB — AMNISURE INTERNAL CONTROL QC: (no result)

## 2019-12-24 PROCEDURE — 86780 TREPONEMA PALLIDUM: CPT

## 2019-12-24 PROCEDURE — 87340 HEPATITIS B SURFACE AG IA: CPT

## 2019-12-24 PROCEDURE — 86901 BLOOD TYPING SEROLOGIC RH(D): CPT

## 2019-12-24 PROCEDURE — 81001 URINALYSIS AUTO W/SCOPE: CPT

## 2019-12-24 PROCEDURE — 84112 EVAL AMNIOTIC FLUID PROTEIN: CPT

## 2019-12-24 PROCEDURE — 85027 COMPLETE CBC AUTOMATED: CPT

## 2019-12-24 PROCEDURE — 36415 COLL VENOUS BLD VENIPUNCTURE: CPT

## 2019-12-24 PROCEDURE — 87389 HIV-1 AG W/HIV-1&-2 AB AG IA: CPT

## 2019-12-24 PROCEDURE — 86762 RUBELLA ANTIBODY: CPT

## 2019-12-24 PROCEDURE — 86900 BLOOD TYPING SEROLOGIC ABO: CPT

## 2019-12-24 PROCEDURE — 86850 RBC ANTIBODY SCREEN: CPT

## 2019-12-24 NOTE — HP
TIME OF SERVICE:  2035 hours.



PRESENTING COMPLAINT:  Contractions at possible rupture of membranes at 34 weeks'

gestation. 



HISTORY OF PRESENT ILLNESS:  Ms. Solares is a 19-year-old  2, para 1, with EDC

of , sees Dr. Beatriz Dooley at [facility].  She has had multiple visits

complaining of contractions.  Last visit was approximately 30 to 31 weeks'

gestation.  She has not received  corticosteroids.  She thought she might

have some slight leaking.  She denies bleeding.  She reports active fetus.  She

reports contractions. 



OB/GYN HISTORY:  Antepartum record not available on the unit.  The patient reports

an unremarkable antepartum history with previous term delivery. 



PAST MEDICAL HISTORY:  Significant for pyelonephritis.



PAST SURGICAL HISTORY:  None.



ALLERGIES:  DENIES.



MEDICATIONS:  Prenatal vitamins.



SOCIAL HISTORY:  Denies tobacco, alcohol, or IV drug use.



FAMILY HISTORY:  Noncontributory.



REVIEW OF SYSTEMS:  Noncontributory.



PHYSICAL EXAMINATION:

GENERAL:  Black female in no acute distress. 

VITAL SIGNS:  Blood pressure 118/72, pulse 85, respirations 18, and temperature

98.6. 

HEENT:  Within normal limits. 

LUNGS:  Clear to auscultation bilaterally. 

HEART:  Regular rhythm. 

ABDOMEN:  Soft and nontender with indelible contractions q.3-8 minutes.  FHTs 140s.

Vulva without lesions.  Vagina slight discharge.  Cervix is 3 to 4, 60, and -2,

cephalic, bag of water intact. 

EXTREMITIES:  No clubbing, cyanosis, or edema.



LABORATORY DATA:  AmniSure is negative for rupture of membranes.



IMPRESSION:  A 34 weeks' gestation with advanced cervical dilatation concerning for

possible  labor. 



PLAN:  Admission, no prenatal care labs due to the lack of antepartum record, and

 corticosteroids.  We will provide IV hydration.  We will utilize Procardia

per protocol if contractions worsen.  We will initiate group B strep prophylaxis if

labor progresses. 







Job ID:  [id number]

## 2019-12-25 LAB
HBSAG INDEX: 0.17 S/CO (ref 0–0.99)
HGB BLD-MCNC: 12.3 G/DL (ref 12–16)
HIV 1/2 INDEX: 0.1 S/CO (ref ?–1)
MCH RBC QN AUTO: 28.8 PG (ref 27–31)
MCV RBC AUTO: 86.1 FL (ref 78–98)
PLATELET # BLD AUTO: 216 THOU/UL (ref 130–400)
RBC # BLD AUTO: 4.26 MILL/UL (ref 4.2–5.4)
RBC UR QL AUTO: (no result) HPF (ref 0–3)
SYPHILIS ANTIBODY INDEX: 0.05 S/CO
WBC # BLD AUTO: 7.9 THOU/UL (ref 4.8–10.8)
WBC UR QL AUTO: (no result) HPF (ref 0–3)

## 2019-12-25 NOTE — DIS
DATE OF ADMISSION:  2019



DATE OF DISCHARGE:  2019



ADMITTING DIAGNOSES:  

1. Intrauterine pregnancy at 33 weeks.

2. Advanced cervical dilation with possible  labor.



DISCHARGE DIAGNOSES:  

1. Intrauterine pregnancy at 33 weeks.

2. Advanced cervical dilation with possible  labor.

3. Labor arrested spontaneously.



PROCEDURE:   steroids.



CONSULTATIONS:  None.



HOSPITAL COURSE:  The patient is a 19-year-old G2, P1 female presenting with

concerns of uterine contractions.  During her evaluation, the patient was noted to

be 3 cm dilated without any clear evidence of labor.  The patient was admitted for

steroids and plan of tocolysis if indicated.  The patient has had no tocolysis over

the last 24 hours.  Continues to report occasional contractions, but has otherwise

had an uncomplicated course.  She denies any vaginal bleeding or leakage of fluid.

Denies urinary urgency or frequency.  She gets her second shot this evening. 



PHYSICAL EXAMINATION:

VITAL SIGNS:  This evening, blood pressure is 108/55, heart rate of 78, respiratory

rate 18 saturating 99% to 100% on room air, temperature 98.5. 

GENERAL:  She appears to be in no acute distress.  She is alert, oriented,

cooperative, and pleasant to interact with. 

HEAD:  Normocephalic, atraumatic. 

ABDOMEN:  Soft, nontender.  Cervix is 3 cm, 50% effaced, -3 station, unchanged from

time of admission.  Fetal heart tracing shows the fetus with a baseline in the 130s

with moderate long-term variability, positive 15 x 15 accelerations, no

decelerations.  She has some irritability seen on the monitor that she occasionally

feels. 



LABORATORY DATA:  Urinalysis is pending.



ASSESSMENT AND PLAN:  The patient is being discharged to home after receiving 2

doses of betamethasone.  She lives here in [facility], has been given  labor

precautions with instructions to return should she start feeling worsening uterine

contractions, vaginal bleeding, or leakage of fluid.  Fetus has a category 1 tracing

and has been instructed to follow up with her primary OB this next week. 







Job ID:  [id number]

## 2019-12-25 NOTE — PRG
DATE OF SERVICE:  2019



TIME OF SERVICE:  0655 hours.



SUBJECTIVE:  The patient is resting comfortably.



OBJECTIVE:  VITAL SIGNS:  Stable.  She remains afebrile. 



Fetal heart rate tracing is category I.  No contractions are noted.



IMPRESSION:  Cervical dilatation to 3 to 4 cm without active labor at this time for

 corticosteroids at 34 weeks' gestation. 



PLAN:  Continue observation on the unit.  Anticipate second dose of 

corticosteroids at p.m. and discharged home.  We will check the patient out to Dr. Thang Rivera, OB hospitalist, taken over at 0800 hours. 







Job ID:  [id number]

## 2020-01-04 ENCOUNTER — HOSPITAL ENCOUNTER (EMERGENCY)
Dept: HOSPITAL 92 - ERS | Age: 20
Discharge: HOME | End: 2020-01-04
Payer: COMMERCIAL

## 2020-01-04 DIAGNOSIS — O99.89: Primary | ICD-10-CM

## 2020-01-04 DIAGNOSIS — Z3A.32: ICD-10-CM

## 2020-01-04 DIAGNOSIS — R06.02: ICD-10-CM

## 2020-01-04 LAB
ALBUMIN SERPL BCG-MCNC: 3.5 G/DL (ref 3.5–5)
ALP SERPL-CCNC: 116 U/L (ref 40–110)
ALT SERPL W P-5'-P-CCNC: (no result) U/L (ref 8–55)
ANION GAP SERPL CALC-SCNC: 13 MMOL/L (ref 10–20)
AST SERPL-CCNC: 12 U/L (ref 5–34)
BASOPHILS # BLD AUTO: 0.1 THOU/UL (ref 0–0.2)
BASOPHILS NFR BLD AUTO: 0.5 % (ref 0–1)
BILIRUB SERPL-MCNC: 0.5 MG/DL (ref 0.2–1.2)
BUN SERPL-MCNC: 5 MG/DL (ref 7–18.7)
CALCIUM SERPL-MCNC: 9 MG/DL (ref 7.8–10.44)
CHLORIDE SERPL-SCNC: 104 MMOL/L (ref 98–107)
CO2 SERPL-SCNC: 23 MMOL/L (ref 22–29)
CREAT CL PREDICTED SERPL C-G-VRATE: 0 ML/MIN (ref 70–130)
EOSINOPHIL # BLD AUTO: 0.1 THOU/UL (ref 0–0.7)
EOSINOPHIL NFR BLD AUTO: 0.6 % (ref 0–10)
GLOBULIN SER CALC-MCNC: 3.5 G/DL (ref 2.4–3.5)
GLUCOSE SERPL-MCNC: 108 MG/DL (ref 70–105)
HGB BLD-MCNC: 12.3 G/DL (ref 12–16)
LIPASE SERPL-CCNC: 18 U/L (ref 8–78)
LYMPHOCYTES # BLD: 1.5 THOU/UL (ref 1.2–3.4)
LYMPHOCYTES NFR BLD AUTO: 14.9 % (ref 21–51)
MCH RBC QN AUTO: 28.3 PG (ref 27–31)
MCV RBC AUTO: 86.6 FL (ref 78–98)
MONOCYTES # BLD AUTO: 0.7 THOU/UL (ref 0.11–0.59)
MONOCYTES NFR BLD AUTO: 6.6 % (ref 0–10)
NEUTROPHILS # BLD AUTO: 7.9 THOU/UL (ref 1.4–6.5)
NEUTROPHILS NFR BLD AUTO: 77.4 % (ref 42–75)
PLATELET # BLD AUTO: 225 THOU/UL (ref 130–400)
POTASSIUM SERPL-SCNC: 3.6 MMOL/L (ref 3.5–5.1)
RBC # BLD AUTO: 4.36 MILL/UL (ref 4.2–5.4)
SODIUM SERPL-SCNC: 136 MMOL/L (ref 136–145)
WBC # BLD AUTO: 10.2 THOU/UL (ref 4.8–10.8)

## 2020-01-04 PROCEDURE — 85379 FIBRIN DEGRADATION QUANT: CPT

## 2020-01-04 PROCEDURE — 80053 COMPREHEN METABOLIC PANEL: CPT

## 2020-01-04 PROCEDURE — 93005 ELECTROCARDIOGRAM TRACING: CPT

## 2020-01-04 PROCEDURE — 83690 ASSAY OF LIPASE: CPT

## 2020-01-04 PROCEDURE — 83880 ASSAY OF NATRIURETIC PEPTIDE: CPT

## 2020-01-04 PROCEDURE — 84484 ASSAY OF TROPONIN QUANT: CPT

## 2020-01-04 PROCEDURE — 85025 COMPLETE CBC W/AUTO DIFF WBC: CPT

## 2020-01-04 PROCEDURE — 71045 X-RAY EXAM CHEST 1 VIEW: CPT

## 2020-01-04 PROCEDURE — 71275 CT ANGIOGRAPHY CHEST: CPT

## 2020-01-04 NOTE — RAD
XR Chest 1 View Portable



HISTORY: Shortness of breath 8 months pregnant



COMPARISON: 8/5/2017 study.



FINDINGS: Heart size is within normal limits for technique. The lungs are clear of infiltrates. No si
gns of failure.



IMPRESSION: No active intrathoracic disease.



Reported By: Zach Fairbanks 

Electronically Signed:  1/4/2020 9:38 PM

## 2020-01-04 NOTE — CT
CTA Angio Chest W WO Con



HISTORY: Shortness of breath. Patient is 8 months pregnant. Patient was shielded for study.



COMPARISON: None.



FINDINGS: The lungs are clear of any infiltrative process no pleural effusions.



The thoracic aorta is normal in caliber.



There is good pulmonary artery opacification, there is no CT evidence for pulmonary embolus.



Visualized liver parenchyma shows no focal findings.



IMPRESSION: No CT evidence of pulmonary embolus.



Reported By: Zach Fairbanks 

Electronically Signed:  1/4/2020 10:45 PM

## 2020-01-23 ENCOUNTER — HOSPITAL ENCOUNTER (INPATIENT)
Dept: HOSPITAL 92 - L&D/OP | Age: 20
LOS: 1 days | Discharge: HOME | End: 2020-01-24
Attending: OBSTETRICS & GYNECOLOGY | Admitting: OBSTETRICS & GYNECOLOGY
Payer: COMMERCIAL

## 2020-01-23 VITALS — BODY MASS INDEX: 25 KG/M2

## 2020-01-23 DIAGNOSIS — Z3A.37: ICD-10-CM

## 2020-01-23 LAB
HBSAG INDEX: 0.3 S/CO (ref 0–0.99)
HGB BLD-MCNC: 13.1 G/DL (ref 12–16)
MCH RBC QN AUTO: 28.7 PG (ref 27–31)
MCV RBC AUTO: 87.9 FL (ref 78–98)
PLATELET # BLD AUTO: 181 THOU/UL (ref 130–400)
RBC # BLD AUTO: 4.55 MILL/UL (ref 4.2–5.4)
SYPHILIS ANTIBODY INDEX: 0.04 S/CO
WBC # BLD AUTO: 7.8 THOU/UL (ref 4.8–10.8)

## 2020-01-23 PROCEDURE — 86850 RBC ANTIBODY SCREEN: CPT

## 2020-01-23 PROCEDURE — 51702 INSERT TEMP BLADDER CATH: CPT

## 2020-01-23 PROCEDURE — 87340 HEPATITIS B SURFACE AG IA: CPT

## 2020-01-23 PROCEDURE — 85027 COMPLETE CBC AUTOMATED: CPT

## 2020-01-23 PROCEDURE — 86901 BLOOD TYPING SEROLOGIC RH(D): CPT

## 2020-01-23 PROCEDURE — 36415 COLL VENOUS BLD VENIPUNCTURE: CPT

## 2020-01-23 PROCEDURE — 86780 TREPONEMA PALLIDUM: CPT

## 2020-01-23 PROCEDURE — 86900 BLOOD TYPING SEROLOGIC ABO: CPT

## 2020-01-23 RX ADMIN — DOCUSATE CALCIUM SCH MG: 240 CAPSULE, LIQUID FILLED ORAL at 21:43

## 2020-01-24 VITALS — DIASTOLIC BLOOD PRESSURE: 59 MMHG | SYSTOLIC BLOOD PRESSURE: 115 MMHG | TEMPERATURE: 98.1 F

## 2020-01-24 LAB
HGB BLD-MCNC: 12 G/DL (ref 12–16)
MCH RBC QN AUTO: 29.2 PG (ref 27–31)
MCV RBC AUTO: 87.9 FL (ref 78–98)
PLATELET # BLD AUTO: 171 THOU/UL (ref 130–400)
RBC # BLD AUTO: 4.12 MILL/UL (ref 4.2–5.4)
WBC # BLD AUTO: 10.1 THOU/UL (ref 4.8–10.8)

## 2020-01-24 RX ADMIN — DOCUSATE CALCIUM SCH MG: 240 CAPSULE, LIQUID FILLED ORAL at 09:04

## 2020-10-29 ENCOUNTER — HOSPITAL ENCOUNTER (EMERGENCY)
Dept: HOSPITAL 92 - ERS | Age: 20
Discharge: HOME | End: 2020-10-29
Payer: COMMERCIAL

## 2020-10-29 DIAGNOSIS — N75.0: Primary | ICD-10-CM

## 2020-10-29 PROCEDURE — 99282 EMERGENCY DEPT VISIT SF MDM: CPT

## 2020-12-02 ENCOUNTER — HOSPITAL ENCOUNTER (EMERGENCY)
Dept: HOSPITAL 92 - ERS | Age: 20
Discharge: HOME | End: 2020-12-02
Payer: COMMERCIAL

## 2020-12-02 DIAGNOSIS — R10.13: Primary | ICD-10-CM

## 2020-12-02 DIAGNOSIS — R11.2: ICD-10-CM

## 2020-12-02 LAB
LEUKOCYTE ESTERASE UR QL STRIP.AUTO: 25 LEU/UL
PREGU CONTROL BACKGROUND?: (no result)
PREGU CONTROL BAR APPEAR?: YES
RBC UR QL AUTO: (no result) HPF (ref 0–3)
SP GR UR STRIP: 1.02 (ref 1–1.04)
WBC UR QL AUTO: (no result) HPF (ref 0–3)

## 2020-12-02 PROCEDURE — 81025 URINE PREGNANCY TEST: CPT

## 2020-12-02 PROCEDURE — 81003 URINALYSIS AUTO W/O SCOPE: CPT

## 2020-12-02 PROCEDURE — 99284 EMERGENCY DEPT VISIT MOD MDM: CPT

## 2020-12-02 PROCEDURE — 81015 MICROSCOPIC EXAM OF URINE: CPT

## 2020-12-08 ENCOUNTER — HOSPITAL ENCOUNTER (EMERGENCY)
Dept: HOSPITAL 92 - ERS | Age: 20
Discharge: HOME | End: 2020-12-08
Payer: COMMERCIAL

## 2020-12-08 DIAGNOSIS — R04.0: Primary | ICD-10-CM

## 2020-12-08 PROCEDURE — 99283 EMERGENCY DEPT VISIT LOW MDM: CPT

## 2020-12-20 ENCOUNTER — HOSPITAL ENCOUNTER (EMERGENCY)
Dept: HOSPITAL 92 - ERS | Age: 20
Discharge: HOME | End: 2020-12-20
Payer: COMMERCIAL

## 2020-12-20 DIAGNOSIS — H81.399: Primary | ICD-10-CM

## 2020-12-20 LAB
ALBUMIN SERPL BCG-MCNC: 4.2 G/DL (ref 3.5–5)
ALP SERPL-CCNC: 56 U/L (ref 40–110)
ALT SERPL W P-5'-P-CCNC: (no result) U/L (ref 8–55)
ANION GAP SERPL CALC-SCNC: 15 MMOL/L (ref 10–20)
AST SERPL-CCNC: 14 U/L (ref 5–34)
BACTERIA UR QL AUTO: (no result) HPF
BASOPHILS # BLD AUTO: 0.1 THOU/UL (ref 0–0.2)
BASOPHILS NFR BLD AUTO: 1.7 % (ref 0–1)
BILIRUB SERPL-MCNC: 0.8 MG/DL (ref 0.2–1.2)
BUN SERPL-MCNC: 7 MG/DL (ref 7–18.7)
CALCIUM SERPL-MCNC: 8.7 MG/DL (ref 7.8–10.44)
CHLORIDE SERPL-SCNC: 108 MMOL/L (ref 98–107)
CO2 SERPL-SCNC: 21 MMOL/L (ref 22–29)
CREAT CL PREDICTED SERPL C-G-VRATE: 0 ML/MIN (ref 70–130)
EOSINOPHIL # BLD AUTO: 0.1 THOU/UL (ref 0–0.7)
EOSINOPHIL NFR BLD AUTO: 3 % (ref 0–10)
GLOBULIN SER CALC-MCNC: 3 G/DL (ref 2.4–3.5)
GLUCOSE SERPL-MCNC: 86 MG/DL (ref 70–105)
HGB BLD-MCNC: 13.4 G/DL (ref 12–16)
LEUKOCYTE ESTERASE UR QL STRIP.AUTO: 250 LEU/UL
LYMPHOCYTES # BLD: 1.4 THOU/UL (ref 1.2–3.4)
LYMPHOCYTES NFR BLD AUTO: 36.2 % (ref 21–51)
MCH RBC QN AUTO: 29.3 PG (ref 27–31)
MCV RBC AUTO: 91.4 FL (ref 78–98)
MONOCYTES # BLD AUTO: 0.2 THOU/UL (ref 0.11–0.59)
MONOCYTES NFR BLD AUTO: 5.5 % (ref 0–10)
NEUTROPHILS # BLD AUTO: 2.1 THOU/UL (ref 1.4–6.5)
NEUTROPHILS NFR BLD AUTO: 53.6 % (ref 42–75)
PLATELET # BLD AUTO: 218 THOU/UL (ref 130–400)
POTASSIUM SERPL-SCNC: 3.6 MMOL/L (ref 3.5–5.1)
PREGU CONTROL BACKGROUND?: (no result)
PREGU CONTROL BAR APPEAR?: YES
RBC # BLD AUTO: 4.57 MILL/UL (ref 4.2–5.4)
RBC UR QL AUTO: (no result) HPF (ref 0–3)
SODIUM SERPL-SCNC: 140 MMOL/L (ref 136–145)
SP GR UR STRIP: 1.02 (ref 1–1.04)
WBC # BLD AUTO: 4 THOU/UL (ref 4.8–10.8)
WBC UR QL AUTO: (no result) HPF (ref 0–3)

## 2020-12-20 PROCEDURE — 85025 COMPLETE CBC W/AUTO DIFF WBC: CPT

## 2020-12-20 PROCEDURE — 81025 URINE PREGNANCY TEST: CPT

## 2020-12-20 PROCEDURE — 81003 URINALYSIS AUTO W/O SCOPE: CPT

## 2020-12-20 PROCEDURE — 99284 EMERGENCY DEPT VISIT MOD MDM: CPT

## 2020-12-20 PROCEDURE — 80053 COMPREHEN METABOLIC PANEL: CPT

## 2020-12-20 PROCEDURE — 81015 MICROSCOPIC EXAM OF URINE: CPT

## 2021-04-16 ENCOUNTER — HOSPITAL ENCOUNTER (EMERGENCY)
Dept: HOSPITAL 92 - ERS | Age: 21
Discharge: HOME | End: 2021-04-16
Payer: COMMERCIAL

## 2021-04-16 DIAGNOSIS — R19.7: ICD-10-CM

## 2021-04-16 DIAGNOSIS — R11.2: ICD-10-CM

## 2021-04-16 DIAGNOSIS — N39.0: Primary | ICD-10-CM

## 2021-04-16 LAB
ALBUMIN SERPL BCG-MCNC: 4.2 G/DL (ref 3.5–5)
ALP SERPL-CCNC: 58 U/L (ref 40–110)
ALT SERPL W P-5'-P-CCNC: 11 U/L (ref 8–55)
ANION GAP SERPL CALC-SCNC: 16 MMOL/L (ref 10–20)
AST SERPL-CCNC: 26 U/L (ref 5–34)
BACTERIA UR QL AUTO: (no result) HPF
BASOPHILS # BLD AUTO: 0 THOU/UL (ref 0–0.2)
BASOPHILS NFR BLD AUTO: 0.6 % (ref 0–1)
BILIRUB SERPL-MCNC: 1.2 MG/DL (ref 0.2–1.2)
BUN SERPL-MCNC: 9 MG/DL (ref 7–18.7)
CALCIUM SERPL-MCNC: 9.2 MG/DL (ref 7.8–10.44)
CHLORIDE SERPL-SCNC: 107 MMOL/L (ref 98–107)
CO2 SERPL-SCNC: 19 MMOL/L (ref 22–29)
CREAT CL PREDICTED SERPL C-G-VRATE: 0 ML/MIN (ref 70–130)
EOSINOPHIL # BLD AUTO: 0.3 THOU/UL (ref 0–0.7)
EOSINOPHIL NFR BLD AUTO: 3.4 % (ref 0–10)
GLOBULIN SER CALC-MCNC: 3.7 G/DL (ref 2.4–3.5)
GLUCOSE SERPL-MCNC: 83 MG/DL (ref 70–105)
HGB BLD-MCNC: 14.3 G/DL (ref 12–16)
LIPASE SERPL-CCNC: 15 U/L (ref 8–78)
LYMPHOCYTES # BLD: 1.1 THOU/UL (ref 1.2–3.4)
LYMPHOCYTES NFR BLD AUTO: 15.3 % (ref 21–51)
MCH RBC QN AUTO: 28.9 PG (ref 27–31)
MCV RBC AUTO: 90.6 FL (ref 78–98)
MONOCYTES # BLD AUTO: 0.5 THOU/UL (ref 0.11–0.59)
MONOCYTES NFR BLD AUTO: 6.2 % (ref 0–10)
NEUTROPHILS # BLD AUTO: 5.5 THOU/UL (ref 1.4–6.5)
NEUTROPHILS NFR BLD AUTO: 74.5 % (ref 42–75)
PLATELET # BLD AUTO: 246 THOU/UL (ref 130–400)
POTASSIUM SERPL-SCNC: 4.2 MMOL/L (ref 3.5–5.1)
PREGS CONTROL BACKGROUND?: (no result)
PREGS CONTROL BAR APPEAR?: YES
RBC # BLD AUTO: 4.94 MILL/UL (ref 4.2–5.4)
RBC UR QL AUTO: (no result) HPF (ref 0–3)
SODIUM SERPL-SCNC: 138 MMOL/L (ref 136–145)
SP GR UR STRIP: 1.02 (ref 1–1.04)
WBC # BLD AUTO: 7.3 THOU/UL (ref 4.8–10.8)
WBC UR QL AUTO: (no result) HPF (ref 0–3)

## 2021-04-16 PROCEDURE — 85025 COMPLETE CBC W/AUTO DIFF WBC: CPT

## 2021-04-16 PROCEDURE — 87186 SC STD MICRODIL/AGAR DIL: CPT

## 2021-04-16 PROCEDURE — 81003 URINALYSIS AUTO W/O SCOPE: CPT

## 2021-04-16 PROCEDURE — 96374 THER/PROPH/DIAG INJ IV PUSH: CPT

## 2021-04-16 PROCEDURE — 84703 CHORIONIC GONADOTROPIN ASSAY: CPT

## 2021-04-16 PROCEDURE — 87086 URINE CULTURE/COLONY COUNT: CPT

## 2021-04-16 PROCEDURE — 87077 CULTURE AEROBIC IDENTIFY: CPT

## 2021-04-16 PROCEDURE — 81015 MICROSCOPIC EXAM OF URINE: CPT

## 2021-04-16 PROCEDURE — 80053 COMPREHEN METABOLIC PANEL: CPT

## 2021-04-16 PROCEDURE — 83690 ASSAY OF LIPASE: CPT

## 2021-04-19 ENCOUNTER — HOSPITAL ENCOUNTER (EMERGENCY)
Dept: HOSPITAL 92 - ERS | Age: 21
Discharge: LEFT BEFORE DISCHARGE | End: 2021-04-19
Payer: COMMERCIAL

## 2021-04-19 DIAGNOSIS — Z53.21: Primary | ICD-10-CM

## 2021-10-31 ENCOUNTER — HOSPITAL ENCOUNTER (EMERGENCY)
Dept: HOSPITAL 92 - CSHERS | Age: 21
LOS: 1 days | Discharge: LEFT BEFORE DISCHARGE | End: 2021-11-01
Payer: COMMERCIAL

## 2021-10-31 DIAGNOSIS — Z53.21: Primary | ICD-10-CM

## 2022-06-05 ENCOUNTER — HOSPITAL ENCOUNTER (EMERGENCY)
Dept: HOSPITAL 92 - CSHERS | Age: 22
Discharge: HOME | End: 2022-06-05
Payer: COMMERCIAL

## 2022-06-05 DIAGNOSIS — O99.891: Primary | ICD-10-CM

## 2022-06-05 DIAGNOSIS — Z3A.14: ICD-10-CM

## 2022-06-05 PROCEDURE — 99283 EMERGENCY DEPT VISIT LOW MDM: CPT

## 2022-06-23 ENCOUNTER — HOSPITAL ENCOUNTER (EMERGENCY)
Dept: HOSPITAL 92 - CSHERS | Age: 22
Discharge: HOME | End: 2022-06-23
Payer: COMMERCIAL

## 2022-06-23 DIAGNOSIS — O98.512: Primary | ICD-10-CM

## 2022-06-23 DIAGNOSIS — Z3A.16: ICD-10-CM

## 2022-06-23 DIAGNOSIS — U07.1: ICD-10-CM

## 2022-06-23 DIAGNOSIS — O21.9: ICD-10-CM

## 2022-06-23 LAB
ALBUMIN SERPL BCG-MCNC: 3.1 G/DL (ref 3.5–5)
ALP SERPL-CCNC: 33 U/L (ref 40–110)
ALT SERPL W P-5'-P-CCNC: 10 U/L (ref 8–55)
ANION GAP SERPL CALC-SCNC: 11 MMOL/L (ref 10–20)
AST SERPL-CCNC: 17 U/L (ref 5–34)
BASOPHILS # BLD AUTO: 0 10X3/UL (ref 0–0.2)
BASOPHILS NFR BLD AUTO: 0.4 % (ref 0–2)
BILIRUB SERPL-MCNC: 0.3 MG/DL (ref 0.2–1.2)
BUN SERPL-MCNC: 6 MG/DL (ref 7–18.7)
CALCIUM SERPL-MCNC: 7.9 MG/DL (ref 7.8–10.44)
CHLORIDE SERPL-SCNC: 109 MMOL/L (ref 98–107)
CO2 SERPL-SCNC: 19 MMOL/L (ref 22–29)
CREAT CL PREDICTED SERPL C-G-VRATE: 0 ML/MIN (ref 70–130)
EOSINOPHIL # BLD AUTO: 0.1 10X3/UL (ref 0–0.5)
EOSINOPHIL NFR BLD AUTO: 1.8 % (ref 0–6)
GLOBULIN SER CALC-MCNC: 2.3 G/DL (ref 2.4–3.5)
GLUCOSE SERPL-MCNC: 111 MG/DL (ref 70–105)
HGB BLD-MCNC: 10.9 G/DL (ref 12–15.5)
LYMPHOCYTES NFR BLD AUTO: 4.4 % (ref 18–47)
MCH RBC QN AUTO: 28.8 PG (ref 27–33)
MCV RBC AUTO: 85.7 FL (ref 81.6–98.3)
MONOCYTES # BLD AUTO: 0.7 10X3/UL (ref 0–1.1)
MONOCYTES NFR BLD AUTO: 11.7 % (ref 0–10)
NEUTROPHILS # BLD AUTO: 4.6 10X3/UL (ref 1.5–8.4)
NEUTROPHILS NFR BLD AUTO: 81.2 % (ref 40–75)
PLATELET # BLD AUTO: 185 10X3/UL (ref 150–450)
POTASSIUM SERPL-SCNC: 3.3 MMOL/L (ref 3.5–5.1)
RBC # BLD AUTO: 3.78 10X6/UL (ref 3.9–5.03)
SARS-COV-2 RNA RESP QL NAA+PROBE: DETECTED
SODIUM SERPL-SCNC: 136 MMOL/L (ref 136–145)
WBC # BLD AUTO: 5.6 10X3/UL (ref 3.5–10.5)

## 2022-06-23 PROCEDURE — 80053 COMPREHEN METABOLIC PANEL: CPT

## 2022-06-23 PROCEDURE — 96361 HYDRATE IV INFUSION ADD-ON: CPT

## 2022-06-23 PROCEDURE — 96360 HYDRATION IV INFUSION INIT: CPT

## 2022-06-23 PROCEDURE — 85025 COMPLETE CBC W/AUTO DIFF WBC: CPT

## 2022-06-23 PROCEDURE — 36415 COLL VENOUS BLD VENIPUNCTURE: CPT

## 2022-07-25 ENCOUNTER — HOSPITAL ENCOUNTER (OUTPATIENT)
Dept: HOSPITAL 92 - CSHULT | Age: 22
Discharge: HOME | End: 2022-07-25
Attending: OBSTETRICS & GYNECOLOGY
Payer: COMMERCIAL

## 2022-07-25 DIAGNOSIS — Z34.82: Primary | ICD-10-CM

## 2022-07-25 DIAGNOSIS — Z3A.20: ICD-10-CM

## 2022-07-25 PROCEDURE — 76805 OB US >/= 14 WKS SNGL FETUS: CPT

## 2022-09-14 ENCOUNTER — HOSPITAL ENCOUNTER (OUTPATIENT)
Dept: HOSPITAL 92 - CSHLD/OP | Age: 22
Discharge: HOME | End: 2022-09-14
Attending: OBSTETRICS & GYNECOLOGY
Payer: COMMERCIAL

## 2022-09-14 VITALS — BODY MASS INDEX: 25 KG/M2

## 2022-09-14 DIAGNOSIS — R10.9: ICD-10-CM

## 2022-09-14 DIAGNOSIS — N89.8: ICD-10-CM

## 2022-09-14 DIAGNOSIS — Z79.899: ICD-10-CM

## 2022-09-14 DIAGNOSIS — O26.893: Primary | ICD-10-CM

## 2022-09-14 LAB
BACTERIA UR QL AUTO: (no result) HPF
RBC UR QL AUTO: (no result) HPF (ref 0–3)
SP GR UR STRIP: 1.01 (ref 1–1.03)
WBC UR QL AUTO: (no result) HPF (ref 0–3)

## 2022-09-14 PROCEDURE — 99283 EMERGENCY DEPT VISIT LOW MDM: CPT

## 2022-09-14 PROCEDURE — 87086 URINE CULTURE/COLONY COUNT: CPT

## 2022-09-14 PROCEDURE — 87077 CULTURE AEROBIC IDENTIFY: CPT

## 2022-09-14 PROCEDURE — 87186 SC STD MICRODIL/AGAR DIL: CPT

## 2022-09-14 PROCEDURE — 87510 GARDNER VAG DNA DIR PROBE: CPT

## 2022-09-14 PROCEDURE — 84112 EVAL AMNIOTIC FLUID PROTEIN: CPT

## 2022-09-14 PROCEDURE — 81001 URINALYSIS AUTO W/SCOPE: CPT

## 2022-09-14 PROCEDURE — 87480 CANDIDA DNA DIR PROBE: CPT

## 2022-09-14 PROCEDURE — 87660 TRICHOMONAS VAGIN DIR PROBE: CPT

## 2022-09-14 PROCEDURE — 96372 THER/PROPH/DIAG INJ SC/IM: CPT

## 2022-10-13 ENCOUNTER — HOSPITAL ENCOUNTER (OUTPATIENT)
Dept: HOSPITAL 92 - CSHLD/OP | Age: 22
Discharge: HOME | End: 2022-10-13
Attending: OBSTETRICS & GYNECOLOGY
Payer: COMMERCIAL

## 2022-10-13 VITALS — BODY MASS INDEX: 24.9 KG/M2

## 2022-10-13 DIAGNOSIS — O98.813: Primary | ICD-10-CM

## 2022-10-13 DIAGNOSIS — B37.31: ICD-10-CM

## 2022-10-13 DIAGNOSIS — Z3A.32: ICD-10-CM

## 2022-10-13 DIAGNOSIS — Z79.899: ICD-10-CM

## 2022-10-13 DIAGNOSIS — O46.93: ICD-10-CM

## 2022-10-13 LAB
BACTERIA UR QL AUTO: (no result) HPF
LEUKOCYTE ESTERASE UR QL STRIP.AUTO: 500
MUCOUS THREADS UR QL AUTO: (no result) LPF
PROT UR STRIP.AUTO-MCNC: 15 MG/DL
RBC UR QL AUTO: (no result) HPF (ref 0–3)
SP GR UR STRIP: 1.02 (ref 1–1.03)
WBC UR QL AUTO: (no result) HPF (ref 0–3)

## 2022-10-13 PROCEDURE — 81015 MICROSCOPIC EXAM OF URINE: CPT

## 2022-10-13 PROCEDURE — 76815 OB US LIMITED FETUS(S): CPT

## 2022-10-13 PROCEDURE — 87480 CANDIDA DNA DIR PROBE: CPT

## 2022-10-13 PROCEDURE — 81003 URINALYSIS AUTO W/O SCOPE: CPT

## 2022-10-13 PROCEDURE — 87660 TRICHOMONAS VAGIN DIR PROBE: CPT

## 2022-10-13 PROCEDURE — 99284 EMERGENCY DEPT VISIT MOD MDM: CPT

## 2022-10-13 PROCEDURE — 87510 GARDNER VAG DNA DIR PROBE: CPT

## 2022-10-18 ENCOUNTER — HOSPITAL ENCOUNTER (OUTPATIENT)
Dept: HOSPITAL 92 - CSHLD/OP | Age: 22
Discharge: HOME | End: 2022-10-18
Attending: OBSTETRICS & GYNECOLOGY
Payer: COMMERCIAL

## 2022-10-18 VITALS — BODY MASS INDEX: 25.9 KG/M2

## 2022-10-18 DIAGNOSIS — O26.893: Primary | ICD-10-CM

## 2022-10-18 DIAGNOSIS — R10.32: ICD-10-CM

## 2022-10-18 DIAGNOSIS — Z3A.33: ICD-10-CM

## 2022-10-18 PROCEDURE — 99282 EMERGENCY DEPT VISIT SF MDM: CPT

## 2022-10-25 ENCOUNTER — HOSPITAL ENCOUNTER (OUTPATIENT)
Dept: HOSPITAL 92 - CSHLD/OP | Age: 22
Discharge: HOME | End: 2022-10-25
Attending: OBSTETRICS & GYNECOLOGY
Payer: COMMERCIAL

## 2022-10-25 VITALS — BODY MASS INDEX: 25.9 KG/M2

## 2022-10-25 DIAGNOSIS — O60.03: Primary | ICD-10-CM

## 2022-10-25 DIAGNOSIS — Z3A.34: ICD-10-CM

## 2022-10-25 DIAGNOSIS — Z79.899: ICD-10-CM

## 2022-10-25 LAB
BACTERIA UR QL AUTO: (no result) HPF
LEUKOCYTE ESTERASE UR QL STRIP.AUTO: 25
MUCOUS THREADS UR QL AUTO: (no result) LPF
PROT UR STRIP.AUTO-MCNC: 15 MG/DL
RBC UR QL AUTO: (no result) HPF (ref 0–3)
SP GR UR STRIP: 1.01 (ref 1–1.03)
WBC UR QL AUTO: (no result) HPF (ref 0–3)

## 2022-10-25 PROCEDURE — 99285 EMERGENCY DEPT VISIT HI MDM: CPT

## 2022-10-25 PROCEDURE — 87510 GARDNER VAG DNA DIR PROBE: CPT

## 2022-10-25 PROCEDURE — 96361 HYDRATE IV INFUSION ADD-ON: CPT

## 2022-10-25 PROCEDURE — 81001 URINALYSIS AUTO W/SCOPE: CPT

## 2022-10-25 PROCEDURE — 87480 CANDIDA DNA DIR PROBE: CPT

## 2022-10-25 PROCEDURE — 87660 TRICHOMONAS VAGIN DIR PROBE: CPT

## 2022-10-25 PROCEDURE — 96360 HYDRATION IV INFUSION INIT: CPT

## 2022-10-25 PROCEDURE — 96372 THER/PROPH/DIAG INJ SC/IM: CPT

## 2022-11-08 ENCOUNTER — HOSPITAL ENCOUNTER (INPATIENT)
Dept: HOSPITAL 92 - CSHLD/OP | Age: 22
LOS: 1 days | Discharge: HOME | DRG: 833 | End: 2022-11-09
Attending: OBSTETRICS & GYNECOLOGY | Admitting: OBSTETRICS & GYNECOLOGY
Payer: COMMERCIAL

## 2022-11-08 VITALS — BODY MASS INDEX: 26.6 KG/M2

## 2022-11-08 DIAGNOSIS — Z20.822: ICD-10-CM

## 2022-11-08 DIAGNOSIS — Z3A.36: ICD-10-CM

## 2022-11-08 DIAGNOSIS — O47.03: Primary | ICD-10-CM

## 2022-11-08 PROCEDURE — 86850 RBC ANTIBODY SCREEN: CPT

## 2022-11-08 PROCEDURE — 85027 COMPLETE CBC AUTOMATED: CPT

## 2022-11-08 PROCEDURE — 99285 EMERGENCY DEPT VISIT HI MDM: CPT

## 2022-11-08 PROCEDURE — 86900 BLOOD TYPING SEROLOGIC ABO: CPT

## 2022-11-08 PROCEDURE — 86901 BLOOD TYPING SEROLOGIC RH(D): CPT

## 2022-11-08 PROCEDURE — 86780 TREPONEMA PALLIDUM: CPT

## 2022-11-08 PROCEDURE — 87340 HEPATITIS B SURFACE AG IA: CPT

## 2022-11-08 PROCEDURE — U0002 COVID-19 LAB TEST NON-CDC: HCPCS

## 2022-11-09 LAB
HBSAG INDEX: 0.15 S/CO (ref 0–0.99)
HGB BLD-MCNC: 11.9 G/DL (ref 12–15.5)
MCH RBC QN AUTO: 28.5 PG (ref 27–33)
MCV RBC AUTO: 86.8 FL (ref 81.6–98.3)
PLATELET # BLD AUTO: 240 10X3/UL (ref 150–450)
RBC # BLD AUTO: 4.18 10X6/UL (ref 3.9–5.03)
SYPHILIS ANTIBODY INDEX: 0.03 S/CO
WBC # BLD AUTO: 8.1 10X3/UL (ref 3.5–10.5)

## 2022-11-14 ENCOUNTER — HOSPITAL ENCOUNTER (OUTPATIENT)
Dept: HOSPITAL 92 - CSHLD/OP | Age: 22
Discharge: HOME | End: 2022-11-14
Attending: OBSTETRICS & GYNECOLOGY
Payer: COMMERCIAL

## 2022-11-14 VITALS — BODY MASS INDEX: 26.6 KG/M2

## 2022-11-14 DIAGNOSIS — O47.1: Primary | ICD-10-CM

## 2022-11-14 DIAGNOSIS — Z3A.37: ICD-10-CM

## 2022-11-14 PROCEDURE — 99283 EMERGENCY DEPT VISIT LOW MDM: CPT

## 2022-11-17 ENCOUNTER — HOSPITAL ENCOUNTER (INPATIENT)
Dept: HOSPITAL 92 - CSHLD/OP | Age: 22
LOS: 2 days | Discharge: HOME | End: 2022-11-19
Attending: OBSTETRICS & GYNECOLOGY | Admitting: OBSTETRICS & GYNECOLOGY
Payer: COMMERCIAL

## 2022-11-17 VITALS — BODY MASS INDEX: 26.6 KG/M2

## 2022-11-17 DIAGNOSIS — Z3A.37: ICD-10-CM

## 2022-11-17 DIAGNOSIS — Z20.822: ICD-10-CM

## 2022-11-17 LAB
HBSAG INDEX: 0.2 S/CO (ref 0–0.99)
HGB BLD-MCNC: 11.9 G/DL (ref 12–15.5)
MCH RBC QN AUTO: 28.1 PG (ref 27–33)
MCV RBC AUTO: 88.7 FL (ref 81.6–98.3)
PLATELET # BLD AUTO: 235 10X3/UL (ref 150–450)
RBC # BLD AUTO: 4.24 10X6/UL (ref 3.9–5.03)
SYPHILIS ANTIBODY INDEX: 0.04 S/CO
WBC # BLD AUTO: 6.1 10X3/UL (ref 3.5–10.5)

## 2022-11-17 PROCEDURE — 87340 HEPATITIS B SURFACE AG IA: CPT

## 2022-11-17 PROCEDURE — 86850 RBC ANTIBODY SCREEN: CPT

## 2022-11-17 PROCEDURE — 86780 TREPONEMA PALLIDUM: CPT

## 2022-11-17 PROCEDURE — 99285 EMERGENCY DEPT VISIT HI MDM: CPT

## 2022-11-17 PROCEDURE — 86900 BLOOD TYPING SEROLOGIC ABO: CPT

## 2022-11-17 PROCEDURE — U0002 COVID-19 LAB TEST NON-CDC: HCPCS

## 2022-11-17 PROCEDURE — 85027 COMPLETE CBC AUTOMATED: CPT

## 2022-11-17 PROCEDURE — 86901 BLOOD TYPING SEROLOGIC RH(D): CPT

## 2022-11-17 RX ADMIN — HYDROCODONE BITARTRATE AND ACETAMINOPHEN PRN TAB: 5; 325 TABLET ORAL at 19:55

## 2022-11-17 RX ADMIN — Medication SCH MLS: at 15:29

## 2022-11-18 RX ADMIN — Medication SCH: at 04:49

## 2022-11-18 RX ADMIN — HYDROCODONE BITARTRATE AND ACETAMINOPHEN PRN TAB: 5; 325 TABLET ORAL at 08:49

## 2022-11-19 VITALS — DIASTOLIC BLOOD PRESSURE: 58 MMHG | SYSTOLIC BLOOD PRESSURE: 124 MMHG | TEMPERATURE: 98.1 F

## 2023-06-13 ENCOUNTER — HOSPITAL ENCOUNTER (EMERGENCY)
Dept: HOSPITAL 92 - CSHERS | Age: 23
Discharge: HOME | End: 2023-06-13
Payer: COMMERCIAL

## 2023-06-13 DIAGNOSIS — N89.8: Primary | ICD-10-CM

## 2023-06-13 PROCEDURE — 99281 EMR DPT VST MAYX REQ PHY/QHP: CPT

## 2023-08-10 ENCOUNTER — HOSPITAL ENCOUNTER (EMERGENCY)
Dept: HOSPITAL 92 - CSHERS | Age: 23
Discharge: HOME | End: 2023-08-10
Payer: COMMERCIAL

## 2023-08-10 DIAGNOSIS — O92.79: Primary | ICD-10-CM

## 2023-08-10 PROCEDURE — 93010 ELECTROCARDIOGRAM REPORT: CPT

## 2023-08-10 PROCEDURE — 99283 EMERGENCY DEPT VISIT LOW MDM: CPT

## 2023-08-10 PROCEDURE — 93005 ELECTROCARDIOGRAM TRACING: CPT

## 2023-09-06 ENCOUNTER — HOSPITAL ENCOUNTER (EMERGENCY)
Dept: HOSPITAL 92 - CSHERS | Age: 23
Discharge: HOME | End: 2023-09-06
Payer: COMMERCIAL

## 2023-09-06 DIAGNOSIS — Z20.822: ICD-10-CM

## 2023-09-06 DIAGNOSIS — N39.0: Primary | ICD-10-CM

## 2023-09-06 DIAGNOSIS — Z32.01: ICD-10-CM

## 2023-09-06 LAB
BACTERIA UR QL AUTO: (no result) HPF
CAUTI INDICATIONS FOR CULTURE: (no result)
HCG UR QL: POSITIVE
LEUKOCYTE ESTERASE UR QL STRIP.AUTO: 100
PREGU CONTROL BACKGROUND?: (no result)
PREGU CONTROL BAR APPEAR?: YES
PROT UR STRIP.AUTO-MCNC: 15 MG/DL
RBC UR QL AUTO: (no result) HPF (ref 0–3)
SP GR UR STRIP: 1.01 (ref 1–1.03)
WBC UR QL AUTO: (no result) HPF (ref 0–3)

## 2023-09-06 PROCEDURE — 87186 SC STD MICRODIL/AGAR DIL: CPT

## 2023-09-06 PROCEDURE — 81001 URINALYSIS AUTO W/SCOPE: CPT

## 2023-09-06 PROCEDURE — 99284 EMERGENCY DEPT VISIT MOD MDM: CPT

## 2023-09-06 PROCEDURE — 87086 URINE CULTURE/COLONY COUNT: CPT

## 2023-09-06 PROCEDURE — 81025 URINE PREGNANCY TEST: CPT

## 2023-09-06 PROCEDURE — 87077 CULTURE AEROBIC IDENTIFY: CPT

## 2023-09-20 ENCOUNTER — HOSPITAL ENCOUNTER (EMERGENCY)
Dept: HOSPITAL 92 - CSHERS | Age: 23
Discharge: HOME | End: 2023-09-20
Payer: SELF-PAY

## 2023-09-20 DIAGNOSIS — Z32.01: ICD-10-CM

## 2023-09-20 DIAGNOSIS — N39.0: Primary | ICD-10-CM

## 2023-09-20 LAB
BACTERIA UR QL AUTO: (no result) HPF
CAUTI INDICATIONS FOR CULTURE: (no result)
LEUKOCYTE ESTERASE UR QL STRIP.AUTO: 500
PROT UR STRIP.AUTO-MCNC: 15 MG/DL
RBC UR QL AUTO: (no result) HPF (ref 0–3)
SP GR UR STRIP: 1.01 (ref 1–1.03)
WBC UR QL AUTO: (no result) HPF (ref 0–3)

## 2023-09-20 PROCEDURE — 99283 EMERGENCY DEPT VISIT LOW MDM: CPT

## 2023-09-20 PROCEDURE — 81001 URINALYSIS AUTO W/SCOPE: CPT

## 2023-12-30 ENCOUNTER — HOSPITAL ENCOUNTER (OUTPATIENT)
Dept: HOSPITAL 92 - CSHLD/OP | Age: 23
LOS: 1 days | Discharge: HOME | End: 2023-12-31
Attending: OBSTETRICS & GYNECOLOGY
Payer: COMMERCIAL

## 2023-12-30 VITALS — BODY MASS INDEX: 24.9 KG/M2

## 2023-12-30 DIAGNOSIS — O36.8120: ICD-10-CM

## 2023-12-30 DIAGNOSIS — Z3A.22: ICD-10-CM

## 2023-12-30 DIAGNOSIS — Z79.899: ICD-10-CM

## 2023-12-30 DIAGNOSIS — O99.891: Primary | ICD-10-CM

## 2023-12-30 DIAGNOSIS — N89.8: ICD-10-CM

## 2023-12-30 PROCEDURE — 87480 CANDIDA DNA DIR PROBE: CPT

## 2023-12-30 PROCEDURE — 87510 GARDNER VAG DNA DIR PROBE: CPT

## 2023-12-30 PROCEDURE — 84112 EVAL AMNIOTIC FLUID PROTEIN: CPT

## 2023-12-30 PROCEDURE — 99285 EMERGENCY DEPT VISIT HI MDM: CPT

## 2023-12-30 PROCEDURE — 87660 TRICHOMONAS VAGIN DIR PROBE: CPT

## 2024-01-16 ENCOUNTER — HOSPITAL ENCOUNTER (OUTPATIENT)
Dept: HOSPITAL 92 - CSHULT | Age: 24
Discharge: HOME | End: 2024-01-16
Attending: NURSE PRACTITIONER
Payer: COMMERCIAL

## 2024-01-16 DIAGNOSIS — Z34.82: Primary | ICD-10-CM

## 2024-01-16 DIAGNOSIS — Z3A.24: ICD-10-CM

## 2024-01-16 PROCEDURE — 76805 OB US >/= 14 WKS SNGL FETUS: CPT

## 2024-02-14 ENCOUNTER — HOSPITAL ENCOUNTER (EMERGENCY)
Dept: HOSPITAL 92 - CSHERS | Age: 24
Discharge: HOME | End: 2024-02-14
Payer: COMMERCIAL

## 2024-02-14 DIAGNOSIS — Z3A.29: ICD-10-CM

## 2024-02-14 DIAGNOSIS — O26.813: Primary | ICD-10-CM

## 2024-02-14 DIAGNOSIS — Z55.6: ICD-10-CM

## 2024-02-14 PROCEDURE — 93005 ELECTROCARDIOGRAM TRACING: CPT

## 2024-02-14 SDOH — EDUCATIONAL SECURITY - EDUCATION ATTAINMENT: PROBLEMS RELATED TO HEALTH LITERACY: Z55.6

## 2024-02-25 ENCOUNTER — HOSPITAL ENCOUNTER (OUTPATIENT)
Dept: HOSPITAL 92 - CSHLD/OP | Age: 24
Discharge: HOME | End: 2024-02-25
Attending: OBSTETRICS & GYNECOLOGY
Payer: COMMERCIAL

## 2024-02-25 DIAGNOSIS — Z79.899: ICD-10-CM

## 2024-02-25 DIAGNOSIS — Z03.71: ICD-10-CM

## 2024-02-25 DIAGNOSIS — Z3A.30: ICD-10-CM

## 2024-02-25 DIAGNOSIS — O47.03: Primary | ICD-10-CM

## 2024-02-25 PROCEDURE — 84112 EVAL AMNIOTIC FLUID PROTEIN: CPT

## 2024-02-25 PROCEDURE — 99284 EMERGENCY DEPT VISIT MOD MDM: CPT

## 2024-02-25 PROCEDURE — 87480 CANDIDA DNA DIR PROBE: CPT

## 2024-02-25 PROCEDURE — 87510 GARDNER VAG DNA DIR PROBE: CPT

## 2024-02-25 PROCEDURE — 87660 TRICHOMONAS VAGIN DIR PROBE: CPT

## 2024-03-24 ENCOUNTER — HOSPITAL ENCOUNTER (OUTPATIENT)
Dept: HOSPITAL 92 - CSHLD/OP | Age: 24
Discharge: HOME | End: 2024-03-24
Attending: OBSTETRICS & GYNECOLOGY
Payer: COMMERCIAL

## 2024-03-24 VITALS — BODY MASS INDEX: 27.4 KG/M2

## 2024-03-24 DIAGNOSIS — Z03.71: ICD-10-CM

## 2024-03-24 DIAGNOSIS — N89.8: ICD-10-CM

## 2024-03-24 DIAGNOSIS — O23.593: Primary | ICD-10-CM

## 2024-03-24 DIAGNOSIS — Z79.899: ICD-10-CM

## 2024-03-24 DIAGNOSIS — Z3A.35: ICD-10-CM

## 2024-03-24 PROCEDURE — 84112 EVAL AMNIOTIC FLUID PROTEIN: CPT

## 2024-06-11 ENCOUNTER — HOSPITAL ENCOUNTER (EMERGENCY)
Dept: HOSPITAL 92 - CSHERS | Age: 24
Discharge: HOME | End: 2024-06-11
Payer: COMMERCIAL

## 2024-06-11 DIAGNOSIS — N62: Primary | ICD-10-CM

## 2024-06-11 DIAGNOSIS — N64.4: ICD-10-CM

## 2024-06-11 DIAGNOSIS — R50.9: ICD-10-CM

## 2024-06-11 PROCEDURE — 93010 ELECTROCARDIOGRAM REPORT: CPT

## 2024-06-11 PROCEDURE — 99284 EMERGENCY DEPT VISIT MOD MDM: CPT

## 2024-06-11 PROCEDURE — 93005 ELECTROCARDIOGRAM TRACING: CPT

## 2025-01-04 ENCOUNTER — HOSPITAL ENCOUNTER (EMERGENCY)
Dept: HOSPITAL 92 - ERS | Age: 25
Discharge: HOME | End: 2025-01-04
Payer: COMMERCIAL

## 2025-01-04 DIAGNOSIS — R30.0: Primary | ICD-10-CM

## 2025-01-04 LAB
CAUTI INDICATIONS FOR CULTURE: (no result)
LEUKOCYTE ESTERASE UR QL STRIP.AUTO: 75 LEU/UL
PREGU CONTROL BACKGROUND?: (no result)
PREGU CONTROL BAR APPEAR?: YES
PROT UR STRIP.AUTO-MCNC: 30 MG/DL
SP GR UR STRIP: 1.03 (ref 1–1.04)
WBC UR QL AUTO: (no result) HPF (ref 0–3)

## 2025-01-04 PROCEDURE — 81025 URINE PREGNANCY TEST: CPT

## 2025-01-04 PROCEDURE — 87491 CHLMYD TRACH DNA AMP PROBE: CPT

## 2025-01-04 PROCEDURE — 87510 GARDNER VAG DNA DIR PROBE: CPT

## 2025-01-04 PROCEDURE — 87660 TRICHOMONAS VAGIN DIR PROBE: CPT

## 2025-01-04 PROCEDURE — 87077 CULTURE AEROBIC IDENTIFY: CPT

## 2025-01-04 PROCEDURE — 87086 URINE CULTURE/COLONY COUNT: CPT

## 2025-01-04 PROCEDURE — 87591 N.GONORRHOEAE DNA AMP PROB: CPT

## 2025-01-04 PROCEDURE — 99283 EMERGENCY DEPT VISIT LOW MDM: CPT

## 2025-01-04 PROCEDURE — 87480 CANDIDA DNA DIR PROBE: CPT

## 2025-01-04 PROCEDURE — 81001 URINALYSIS AUTO W/SCOPE: CPT

## 2025-02-21 ENCOUNTER — HOSPITAL ENCOUNTER (EMERGENCY)
Dept: HOSPITAL 92 - ERS | Age: 25
Discharge: HOME | End: 2025-02-21
Payer: COMMERCIAL

## 2025-02-21 DIAGNOSIS — H10.9: Primary | ICD-10-CM

## 2025-02-21 PROCEDURE — 99283 EMERGENCY DEPT VISIT LOW MDM: CPT

## 2025-07-25 ENCOUNTER — HOSPITAL ENCOUNTER (EMERGENCY)
Dept: HOSPITAL 92 - ERS | Age: 25
Discharge: HOME | End: 2025-07-25
Payer: SELF-PAY

## 2025-07-25 DIAGNOSIS — J06.9: Primary | ICD-10-CM

## 2025-07-25 PROCEDURE — 99283 EMERGENCY DEPT VISIT LOW MDM: CPT

## 2025-07-25 PROCEDURE — 87428 SARSCOV & INF VIR A&B AG IA: CPT
